# Patient Record
Sex: FEMALE | Race: WHITE | Employment: PART TIME | URBAN - METROPOLITAN AREA
[De-identification: names, ages, dates, MRNs, and addresses within clinical notes are randomized per-mention and may not be internally consistent; named-entity substitution may affect disease eponyms.]

---

## 2018-09-25 ENCOUNTER — APPOINTMENT (OUTPATIENT)
Dept: MRI IMAGING | Facility: CLINIC | Age: 48
End: 2018-09-25
Attending: PHYSICIAN ASSISTANT
Payer: COMMERCIAL

## 2018-09-25 ENCOUNTER — HOSPITAL ENCOUNTER (OUTPATIENT)
Facility: CLINIC | Age: 48
Setting detail: OBSERVATION
Discharge: HOME | End: 2018-09-27
Attending: EMERGENCY MEDICINE | Admitting: HOSPITALIST
Payer: COMMERCIAL

## 2018-09-25 DIAGNOSIS — R42 VERTIGO: ICD-10-CM

## 2018-09-25 DIAGNOSIS — H81.20 VESTIBULAR NEURONITIS, UNSPECIFIED LATERALITY: Primary | ICD-10-CM

## 2018-09-25 LAB
ANION GAP SERPL CALCULATED.3IONS-SCNC: 9 MMOL/L (ref 3–14)
B-HCG FREE SERPL-ACNC: 6 IU/L
B-HCG SERPL-ACNC: <1 IU/L (ref 0–5)
BASOPHILS # BLD AUTO: 0.1 10E9/L (ref 0–0.2)
BASOPHILS NFR BLD AUTO: 0.6 %
BUN SERPL-MCNC: 13 MG/DL (ref 7–30)
CALCIUM SERPL-MCNC: 8.6 MG/DL (ref 8.5–10.1)
CHLORIDE SERPL-SCNC: 107 MMOL/L (ref 94–109)
CO2 SERPL-SCNC: 23 MMOL/L (ref 20–32)
CREAT SERPL-MCNC: 0.72 MG/DL (ref 0.52–1.04)
DIFFERENTIAL METHOD BLD: NORMAL
EOSINOPHIL # BLD AUTO: 0.1 10E9/L (ref 0–0.7)
EOSINOPHIL NFR BLD AUTO: 1.1 %
ERYTHROCYTE [DISTWIDTH] IN BLOOD BY AUTOMATED COUNT: 13.3 % (ref 10–15)
GFR SERPL CREATININE-BSD FRML MDRD: 86 ML/MIN/1.7M2
GLUCOSE SERPL-MCNC: 104 MG/DL (ref 70–99)
HCT VFR BLD AUTO: 38.9 % (ref 35–47)
HGB BLD-MCNC: 12.9 G/DL (ref 11.7–15.7)
IMM GRANULOCYTES # BLD: 0 10E9/L (ref 0–0.4)
IMM GRANULOCYTES NFR BLD: 0.3 %
LYMPHOCYTES # BLD AUTO: 1.2 10E9/L (ref 0.8–5.3)
LYMPHOCYTES NFR BLD AUTO: 13 %
MCH RBC QN AUTO: 31.9 PG (ref 26.5–33)
MCHC RBC AUTO-ENTMCNC: 33.2 G/DL (ref 31.5–36.5)
MCV RBC AUTO: 96 FL (ref 78–100)
MONOCYTES # BLD AUTO: 0.4 10E9/L (ref 0–1.3)
MONOCYTES NFR BLD AUTO: 4.6 %
NEUTROPHILS # BLD AUTO: 7.2 10E9/L (ref 1.6–8.3)
NEUTROPHILS NFR BLD AUTO: 80.4 %
NRBC # BLD AUTO: 0 10*3/UL
NRBC BLD AUTO-RTO: 0 /100
PLATELET # BLD AUTO: 325 10E9/L (ref 150–450)
POTASSIUM SERPL-SCNC: 3.8 MMOL/L (ref 3.4–5.3)
RBC # BLD AUTO: 4.05 10E12/L (ref 3.8–5.2)
SODIUM SERPL-SCNC: 139 MMOL/L (ref 133–144)
WBC # BLD AUTO: 8.9 10E9/L (ref 4–11)

## 2018-09-25 PROCEDURE — 70544 MR ANGIOGRAPHY HEAD W/O DYE: CPT

## 2018-09-25 PROCEDURE — 25000128 H RX IP 250 OP 636: Performed by: PHYSICIAN ASSISTANT

## 2018-09-25 PROCEDURE — 84702 CHORIONIC GONADOTROPIN TEST: CPT

## 2018-09-25 PROCEDURE — 25000128 H RX IP 250 OP 636: Performed by: EMERGENCY MEDICINE

## 2018-09-25 PROCEDURE — 80048 BASIC METABOLIC PNL TOTAL CA: CPT | Performed by: PHYSICIAN ASSISTANT

## 2018-09-25 PROCEDURE — 96375 TX/PRO/DX INJ NEW DRUG ADDON: CPT

## 2018-09-25 PROCEDURE — 25000131 ZZH RX MED GY IP 250 OP 636 PS 637: Performed by: PHYSICIAN ASSISTANT

## 2018-09-25 PROCEDURE — 99219 ZZC INITIAL OBSERVATION CARE,LEVL II: CPT | Performed by: PHYSICIAN ASSISTANT

## 2018-09-25 PROCEDURE — 25000132 ZZH RX MED GY IP 250 OP 250 PS 637: Performed by: PHYSICIAN ASSISTANT

## 2018-09-25 PROCEDURE — G0378 HOSPITAL OBSERVATION PER HR: HCPCS

## 2018-09-25 PROCEDURE — 84702 CHORIONIC GONADOTROPIN TEST: CPT | Performed by: PHYSICIAN ASSISTANT

## 2018-09-25 PROCEDURE — 25000125 ZZHC RX 250: Performed by: PHYSICIAN ASSISTANT

## 2018-09-25 PROCEDURE — 25500064 ZZH RX 255 OP 636: Performed by: EMERGENCY MEDICINE

## 2018-09-25 PROCEDURE — A9585 GADOBUTROL INJECTION: HCPCS | Performed by: EMERGENCY MEDICINE

## 2018-09-25 PROCEDURE — 96374 THER/PROPH/DIAG INJ IV PUSH: CPT

## 2018-09-25 PROCEDURE — 85025 COMPLETE CBC W/AUTO DIFF WBC: CPT | Performed by: PHYSICIAN ASSISTANT

## 2018-09-25 PROCEDURE — 99207 ZZC CDG-MDM COMPONENT: MEETS LOW - DOWN CODED: CPT | Performed by: PHYSICIAN ASSISTANT

## 2018-09-25 PROCEDURE — 96361 HYDRATE IV INFUSION ADD-ON: CPT

## 2018-09-25 PROCEDURE — 70549 MR ANGIOGRAPH NECK W/O&W/DYE: CPT

## 2018-09-25 PROCEDURE — 70553 MRI BRAIN STEM W/O & W/DYE: CPT

## 2018-09-25 PROCEDURE — 99285 EMERGENCY DEPT VISIT HI MDM: CPT | Mod: 25

## 2018-09-25 RX ORDER — MECLIZINE HCL 12.5 MG 12.5 MG/1
25 TABLET ORAL 4 TIMES DAILY PRN
Qty: 30 TABLET | Refills: 0 | Status: SHIPPED | OUTPATIENT
Start: 2018-09-25 | End: 2018-09-27

## 2018-09-25 RX ORDER — DIPHENHYDRAMINE HYDROCHLORIDE 50 MG/ML
25 INJECTION INTRAMUSCULAR; INTRAVENOUS ONCE
Status: COMPLETED | OUTPATIENT
Start: 2018-09-25 | End: 2018-09-25

## 2018-09-25 RX ORDER — ONDANSETRON 2 MG/ML
4 INJECTION INTRAMUSCULAR; INTRAVENOUS EVERY 6 HOURS PRN
Status: DISCONTINUED | OUTPATIENT
Start: 2018-09-25 | End: 2018-09-27 | Stop reason: HOSPADM

## 2018-09-25 RX ORDER — METOCLOPRAMIDE HYDROCHLORIDE 5 MG/ML
10 INJECTION INTRAMUSCULAR; INTRAVENOUS ONCE
Status: COMPLETED | OUTPATIENT
Start: 2018-09-25 | End: 2018-09-25

## 2018-09-25 RX ORDER — NALOXONE HYDROCHLORIDE 0.4 MG/ML
.1-.4 INJECTION, SOLUTION INTRAMUSCULAR; INTRAVENOUS; SUBCUTANEOUS
Status: DISCONTINUED | OUTPATIENT
Start: 2018-09-25 | End: 2018-09-27 | Stop reason: HOSPADM

## 2018-09-25 RX ORDER — AMOXICILLIN 250 MG
2 CAPSULE ORAL 2 TIMES DAILY PRN
Status: DISCONTINUED | OUTPATIENT
Start: 2018-09-25 | End: 2018-09-27 | Stop reason: HOSPADM

## 2018-09-25 RX ORDER — ONDANSETRON 2 MG/ML
4 INJECTION INTRAMUSCULAR; INTRAVENOUS EVERY 30 MIN PRN
Status: DISCONTINUED | OUTPATIENT
Start: 2018-09-25 | End: 2018-09-25

## 2018-09-25 RX ORDER — LORAZEPAM 2 MG/ML
1 INJECTION INTRAMUSCULAR ONCE
Status: COMPLETED | OUTPATIENT
Start: 2018-09-25 | End: 2018-09-25

## 2018-09-25 RX ORDER — IBUPROFEN 600 MG/1
600 TABLET, FILM COATED ORAL EVERY 6 HOURS PRN
Status: DISCONTINUED | OUTPATIENT
Start: 2018-09-25 | End: 2018-09-27 | Stop reason: HOSPADM

## 2018-09-25 RX ORDER — GADOBUTROL 604.72 MG/ML
10 INJECTION INTRAVENOUS ONCE
Status: COMPLETED | OUTPATIENT
Start: 2018-09-25 | End: 2018-09-25

## 2018-09-25 RX ORDER — PROCHLORPERAZINE MALEATE 5 MG
10 TABLET ORAL EVERY 6 HOURS PRN
Status: DISCONTINUED | OUTPATIENT
Start: 2018-09-25 | End: 2018-09-27 | Stop reason: HOSPADM

## 2018-09-25 RX ORDER — DIAZEPAM 10 MG/2ML
5 INJECTION, SOLUTION INTRAMUSCULAR; INTRAVENOUS ONCE
Status: COMPLETED | OUTPATIENT
Start: 2018-09-25 | End: 2018-09-25

## 2018-09-25 RX ORDER — SODIUM CHLORIDE 9 MG/ML
1000 INJECTION, SOLUTION INTRAVENOUS CONTINUOUS
Status: DISCONTINUED | OUTPATIENT
Start: 2018-09-25 | End: 2018-09-25

## 2018-09-25 RX ORDER — LORATADINE 10 MG/1
10 TABLET ORAL DAILY
Status: DISCONTINUED | OUTPATIENT
Start: 2018-09-25 | End: 2018-09-27 | Stop reason: HOSPADM

## 2018-09-25 RX ORDER — PROCHLORPERAZINE 25 MG
25 SUPPOSITORY, RECTAL RECTAL EVERY 12 HOURS PRN
Status: DISCONTINUED | OUTPATIENT
Start: 2018-09-25 | End: 2018-09-27 | Stop reason: HOSPADM

## 2018-09-25 RX ORDER — MECLIZINE HYDROCHLORIDE 25 MG/1
25 TABLET ORAL EVERY 6 HOURS SCHEDULED
Status: DISCONTINUED | OUTPATIENT
Start: 2018-09-25 | End: 2018-09-27 | Stop reason: HOSPADM

## 2018-09-25 RX ORDER — TRIAMCINOLONE ACETONIDE 55 UG/1
1-2 SPRAY, METERED NASAL DAILY PRN
COMMUNITY

## 2018-09-25 RX ORDER — ACETAMINOPHEN 650 MG/1
650 SUPPOSITORY RECTAL EVERY 4 HOURS PRN
Status: DISCONTINUED | OUTPATIENT
Start: 2018-09-25 | End: 2018-09-27 | Stop reason: HOSPADM

## 2018-09-25 RX ORDER — ACETAMINOPHEN 325 MG/1
650 TABLET ORAL EVERY 4 HOURS PRN
Status: DISCONTINUED | OUTPATIENT
Start: 2018-09-25 | End: 2018-09-27 | Stop reason: HOSPADM

## 2018-09-25 RX ORDER — DIAZEPAM 5 MG
5 TABLET ORAL EVERY 6 HOURS PRN
Status: DISCONTINUED | OUTPATIENT
Start: 2018-09-25 | End: 2018-09-26

## 2018-09-25 RX ORDER — MECLIZINE HYDROCHLORIDE 25 MG/1
25 TABLET ORAL ONCE
Status: COMPLETED | OUTPATIENT
Start: 2018-09-25 | End: 2018-09-25

## 2018-09-25 RX ORDER — AMOXICILLIN 250 MG
1 CAPSULE ORAL 2 TIMES DAILY PRN
Status: DISCONTINUED | OUTPATIENT
Start: 2018-09-25 | End: 2018-09-27 | Stop reason: HOSPADM

## 2018-09-25 RX ORDER — POLYETHYLENE GLYCOL 3350 17 G/17G
17 POWDER, FOR SOLUTION ORAL DAILY PRN
Status: DISCONTINUED | OUTPATIENT
Start: 2018-09-25 | End: 2018-09-27 | Stop reason: HOSPADM

## 2018-09-25 RX ORDER — LIDOCAINE 40 MG/G
CREAM TOPICAL
Status: DISCONTINUED | OUTPATIENT
Start: 2018-09-25 | End: 2018-09-27 | Stop reason: HOSPADM

## 2018-09-25 RX ORDER — ONDANSETRON 4 MG/1
4 TABLET, FILM COATED ORAL EVERY 8 HOURS PRN
Qty: 6 TABLET | Refills: 0 | Status: SHIPPED | OUTPATIENT
Start: 2018-09-25 | End: 2018-09-27

## 2018-09-25 RX ORDER — ONDANSETRON 4 MG/1
4 TABLET, ORALLY DISINTEGRATING ORAL EVERY 6 HOURS PRN
Status: DISCONTINUED | OUTPATIENT
Start: 2018-09-25 | End: 2018-09-27 | Stop reason: HOSPADM

## 2018-09-25 RX ORDER — LORATADINE 10 MG/1
10 TABLET ORAL DAILY
COMMUNITY
End: 2020-09-01

## 2018-09-25 RX ADMIN — LORAZEPAM 1 MG: 2 INJECTION INTRAMUSCULAR; INTRAVENOUS at 15:59

## 2018-09-25 RX ADMIN — LORATADINE 10 MG: 10 TABLET ORAL at 20:30

## 2018-09-25 RX ADMIN — ONDANSETRON 4 MG: 2 INJECTION INTRAMUSCULAR; INTRAVENOUS at 12:11

## 2018-09-25 RX ADMIN — MECLIZINE HYDROCHLORIDE 25 MG: 25 TABLET ORAL at 12:10

## 2018-09-25 RX ADMIN — DIAZEPAM 5 MG: 5 INJECTION, SOLUTION INTRAMUSCULAR; INTRAVENOUS at 16:55

## 2018-09-25 RX ADMIN — MECLIZINE HYDROCHLORIDE 25 MG: 25 TABLET ORAL at 20:30

## 2018-09-25 RX ADMIN — PREDNISONE 60 MG: 10 TABLET ORAL at 20:30

## 2018-09-25 RX ADMIN — DIPHENHYDRAMINE HYDROCHLORIDE 25 MG: 50 INJECTION, SOLUTION INTRAMUSCULAR; INTRAVENOUS at 13:58

## 2018-09-25 RX ADMIN — SODIUM CHLORIDE 1000 ML: 9 INJECTION, SOLUTION INTRAVENOUS at 13:34

## 2018-09-25 RX ADMIN — GADOBUTROL 10 ML: 604.72 INJECTION INTRAVENOUS at 15:32

## 2018-09-25 RX ADMIN — SODIUM CHLORIDE 1000 ML: 9 INJECTION, SOLUTION INTRAVENOUS at 12:11

## 2018-09-25 RX ADMIN — MECLIZINE HYDROCHLORIDE 25 MG: 25 TABLET ORAL at 23:34

## 2018-09-25 RX ADMIN — METOCLOPRAMIDE 10 MG: 5 INJECTION, SOLUTION INTRAMUSCULAR; INTRAVENOUS at 14:00

## 2018-09-25 ASSESSMENT — ENCOUNTER SYMPTOMS
HEADACHES: 1
CHEST TIGHTNESS: 1
NECK PAIN: 0
FEVER: 0
VOMITING: 1
DIZZINESS: 1
NAUSEA: 1

## 2018-09-25 NOTE — ED NOTES
Bed: ED32  Expected date: 9/25/18  Expected time: 11:03 AM  Means of arrival: Ambulance  Comments:  [id number]  49yo vertigo

## 2018-09-25 NOTE — ED AVS SNAPSHOT
[facility] Emergency Department    [address]    [facility] [address]    Phone:  [phone]    Fax:  [phone]                                       Lorraine Sepulveda   MRN: [id number]    Department:  [facility] Emergency Department   Date of Visit:  9/25/2018           Patient Information     Date Of Birth          1970        Your diagnoses for this visit were:     Benign paroxysmal positional vertigo, bilateral        You were seen by Juliocesar Anderson MD.      Follow-up Information     Schedule an appointment as soon as possible for a visit with [facility].    Contact information:    [address]  [address]  [place] [address] [phone]        Follow up with Clinic, [facility].    Why:  As needed    Contact information:    [address] [place] [id number] [phone]          Follow up with [facility] Emergency Department.    Specialty:  EMERGENCY MEDICINE    Why:  If symptoms worsen    Contact information:    [address] [facility] [address] [phone]        Discharge Instructions         Benign Paroxysmal Positional Vertigo     Your health care provider may move your head in certain ways to treat your BPPV.     Benign paroxysmal positional vertigo (BPPV) is a problem with the inner ear. The inner ear contains the vestibular system. This system is what helps you keep your balance. BPPV causes a feeling of spinning. It is a common problem of the vestibular system.  Understanding the vestibular system  The vestibular system of the ear is made up of very tiny parts. They include the utricle, saccule, and semicircular canals. The utricle is a tiny organ that contains calcium crystals. In some people, the crystals can move into the semicircular canals. When this happens, the system no longer works as it should. This causes BPPV. Benign means it is not life threatening. Paroxysmal  means it happens suddenly. Positional means that it happens when you move your head. Vertigo is a feeling of spinning.  What causes BPPV?  Causes include injury to your head or neck. Other problems with the vestibular system may cause BPPV. In many people, the cause of BPPV is not known.  Symptoms of BPPV  You many have repeated feelings of spinning (vertigo). The vertigo usually lasts less than 1 minute. Some movements, such as rolling over in bed, can bring on vertigo.  Diagnosing BPPV  Your primary healthcare provider may diagnose and treat your BPPV. Or you may see an ear, nose, and throat doctor (otolaryngologist). In some cases, you may see a nervous system doctor (neurologist).  The healthcare provider will ask about your symptoms and your medical history. He or she will examine you. You may have hearing and balance tests. As part of the exam, your healthcare provider may have you move your head and body in certain ways. If you have BPPV, the movements can bring on vertigo. Your provider will also look for abnormal movements of your eyes. You may have other tests to check your vestibular or nervous systems.  Treatment for BPPV  Your healthcare provider may try to move the calcium crystals. This is done by having you move your head and neck in certain ways. This treatment is safe and often works well. You may also be told to do these movements at home. You may still have vertigo for a few weeks. Your healthcare provider will recheck your symptoms, usually in about a month. Special physical therapy may also be part of treatment. In rare cases, surgery may be needed for BPPV that does not go away.     When to call the healthcare provider  Call your healthcare provider right away if you have any of these:    Symptoms that do not go away with treatment    Symptoms that get worse    New symptoms   Date Last Reviewed: 5/1/2017 2000-2017 [employer]. [address] [place] [address], [place] [address]. All  rights reserved. This information is not intended as a substitute for professional medical care. Always follow your healthcare professional's instructions.          24 Hour Appointment Hotline       To make an appointment at any [facility], call [phone] ([phone]). If you don't have a family doctor or clinic, we will help you find one. [facility] clinics are conveniently located to serve the needs of you and your family.             Review of your medicines      START taking        Dose / Directions Last dose taken    meclizine 12.5 MG tablet   Commonly known as:  ANTIVERT   Dose:  25 mg   Quantity:  30 tablet        Take 2 tablets (25 mg) by mouth 4 times daily as needed for dizziness   Refills:  0        ondansetron 4 MG tablet   Commonly known as:  ZOFRAN   Dose:  4 mg   Quantity:  6 tablet        Take 1 tablet (4 mg) by mouth every 8 hours as needed for nausea   Refills:  0                Prescriptions were sent or printed at these locations (2 Prescriptions)                   Other Prescriptions                Printed at Department/Unit printer (2 of 2)         meclizine (ANTIVERT) 12.5 MG tablet               ondansetron (ZOFRAN) 4 MG tablet                Procedures and tests performed during your visit     Basic metabolic panel    CBC with platelets differential    HCG quantitative pregnancy    ISTAT HCG Quantitative Pregnancy Nursing POCT    ISTAT HCG Quantitative Pregnancy POCT    MR Brain w/o & w Contrast    MR Head w/o Contrast Angiogram    MR Neck w/o & w Contrast Angiogram      Orders Needing Specimen Collection     None      Pending Results     No orders found from 9/23/2018 to 9/26/2018.            Pending Culture Results     No orders found from 9/23/2018 to 9/26/2018.            Pending Results Instructions     If you had any lab results that were not finalized at the time of your Discharge, you can call the ED Lab Result RN at [phone]. You will be contacted by this team for  any positive Lab results or changes in treatment. The nurses are available 7 days a week from 10A to 6:30P.  You can leave a message 24 hours per day and they will return your call.        Test Results From Your Hospital Stay        9/25/2018 12:52 PM      Component Results     Component Value Ref Range & Units Status    WBC 8.9 4.0 - 11.0 10e9/L Final    RBC Count 4.05 3.8 - 5.2 10e12/L Final    Hemoglobin 12.9 11.7 - 15.7 g/dL Final    Hematocrit 38.9 35.0 - 47.0 % Final    MCV 96 78 - 100 fl Final    MCH 31.9 26.5 - 33.0 pg Final    MCHC 33.2 31.5 - 36.5 g/dL Final    RDW 13.3 10.0 - 15.0 % Final    Platelet Count 325 150 - 450 10e9/L Final    Diff Method Automated Method  Final    % Neutrophils 80.4 % Final    % Lymphocytes 13.0 % Final    % Monocytes 4.6 % Final    % Eosinophils 1.1 % Final    % Basophils 0.6 % Final    % Immature Granulocytes 0.3 % Final    Nucleated RBCs 0 0 /100 Final    Absolute Neutrophil 7.2 1.6 - 8.3 10e9/L Final    Absolute Lymphocytes 1.2 0.8 - 5.3 10e9/L Final    Absolute Monocytes 0.4 0.0 - 1.3 10e9/L Final    Absolute Eosinophils 0.1 0.0 - 0.7 10e9/L Final    Absolute Basophils 0.1 0.0 - 0.2 10e9/L Final    Abs Immature Granulocytes 0.0 0 - 0.4 10e9/L Final    Absolute Nucleated RBC 0.0  Final         9/25/2018  1:24 PM      Component Results     Component Value Ref Range & Units Status    Sodium 139 133 - 144 mmol/L Final    Potassium 3.8 3.4 - 5.3 mmol/L Final    Chloride 107 94 - 109 mmol/L Final    Carbon Dioxide 23 20 - 32 mmol/L Final    Anion Gap 9 3 - 14 mmol/L Final    Glucose 104 (H) 70 - 99 mg/dL Final    Urea Nitrogen 13 7 - 30 mg/dL Final    Creatinine 0.72 0.52 - 1.04 mg/dL Final    GFR Estimate 86 >60 mL/min/1.7m2 Final    Non  GFR Calc    GFR Estimate If Black >90 >60 mL/min/1.7m2 Final    African American GFR Calc    Calcium 8.6 8.5 - 10.1 mg/dL Final         9/25/2018  4:11 PM      Narrative     MRI BRAIN WITHOUT AND WITH CONTRAST  9/25/2018 3:42  PM    HISTORY: Chronic persistent vertigo.      TECHNIQUE: Multiplanar, multisequence MRI of the brain without and  with 10 mL Gadavist.    COMPARISON: None    FINDINGS: Mild volume loss is present commensurate with age. Few  frontoparietal predominant T2 FLAIR hyperintensities likely represent  chronic small vessel ischemic change commensurate with age. The  cerebral hemispheres, brainstem, and cerebellum otherwise demonstrate  normal morphology and attenuation. Subcentimeter left perivascular  space noted within the basilar ganglia. No evidence of acute ischemia,  hemorrhage, mass, mass effect, or hydrocephalus. No abnormal  enhancement or diffusion restriction is identified. Visualized cranial  nerves and turbinate bones are unremarkable. The visualized calvarium,  skull base, paranasal sinuses, and extracranial soft tissues are  unremarkable.        Impression     IMPRESSION: Unremarkable MRI of the head with and without contrast.    KENROY MYLES MD         9/25/2018  4:02 PM      Narrative     MR ANGIOGRAM OF THE HEAD WITHOUT CONTRAST   9/25/2018 3:42 PM     HISTORY: Headache     TECHNIQUE: 3D time-of-flight MR angiogram of the head without  contrast.    COMPARISON: None.    FINDINGS: The bilateral intracranial vertebral arteries, basilar  artery, and posterior cerebral arteries are patent. The bilateral  intracranial internal carotid arteries, anterior cerebral arteries,  and middle cerebral arteries are patent. No aneurysms or vascular  reformations are identified. Patent bilateral posterior communicating  arteries are identified. Possible tiny patent anterior communicating  artery.        Impression     IMPRESSION: Normal MR angiogram of the head.        KENROY MYLES MD         9/25/2018  4:02 PM      Narrative     MRA NECK WITHOUT AND WITH CONTRAST  9/25/2018 3:42 PM     HISTORY: Headache.     TECHNIQUE: 2D time-of-flight MR angiogram of the neck without contrast  and 3D MR angiogram of the neck with  10 mL Gadavist. Estimates of  carotid stenoses are made relative to the distal internal carotid  artery diameters except as noted.    COMPARISON: None.    FINDINGS:    Normal origin of the great vessels from the aortic arch.    Right carotid artery: The right common and internal carotid arteries  are patent. No significant stenosis.      Left carotid artery: The left common and internal carotid arteries are  patent. No significant stenosis.      Vertebral arteries: Vertebral arteries appear patent without evidence  of dissection. No significant stenosis.          Impression     IMPRESSION:  Normal MR angiogram of the neck.        KENROY MYLES MD         9/25/2018  1:26 PM      Component Results     Component Value Ref Range & Units Status    HCG Quantitative Serum <1 0 - 5 IU/L Final         9/25/2018 12:45 PM      Component Results     Component Value Ref Range & Units Status    HCG Quantitative Serum 6.0 (H) <5.0 IU/L Final                Clinical Quality Measure: Blood Pressure Screening     Your blood pressure was checked while you were in the emergency department today. The last reading we obtained was  BP: 136/82 . Please read the guidelines below about what these numbers mean and what you should do about them.  If your systolic blood pressure (the top number) is less than 120 and your diastolic blood pressure (the bottom number) is less than 80, then your blood pressure is normal. There is nothing more that you need to do about it.  If your systolic blood pressure (the top number) is 120-139 or your diastolic blood pressure (the bottom number) is 80-89, your blood pressure may be higher than it should be. You should have your blood pressure rechecked within a year by a primary care provider.  If your systolic blood pressure (the top number) is 140 or greater or your diastolic blood pressure (the bottom number) is 90 or greater, you may have high blood pressure. High blood pressure is treatable, but if left  "untreated over time it can put you at risk for heart attack, stroke, or kidney failure. You should have your blood pressure rechecked by a primary care provider within the next 4 weeks.  If your provider in the emergency department today gave you specific instructions to follow-up with your doctor or provider even sooner than that, you should follow that instruction and not wait for up to 4 weeks for your follow-up visit.        Thank you for choosing [facility]       Thank you for choosing [facility] for your care. Our goal is always to provide you with excellent care. Hearing back from our patients is one way we can continue to improve our services. Please take a few minutes to complete the written survey that you may receive in the mail after you visit with us. Thank you!        [employer] Information     [employer] lets you send messages to your doctor, view your test results, renew your prescriptions, schedule appointments and more. To sign up, go to [email or web address] . Click on \"Log in\" on the left side of the screen, which will take you to the Welcome page. Then click on \"Sign up Now\" on the right side of the page.     You will be asked to enter the access code listed below, as well as some personal information. Please follow the directions to create your username and password.     Your access code is: [email or web address]  Expires: 2018  4:28 PM     Your access code will  in 90 days. If you need help or a new code, please call your [facility] clinic or [phone].        Care EveryWhere ID     This is your Care EveryWhere ID. This could be used by other organizations to access your [facility] medical records  [id number]        Equal Access to Services     [facility] : Hadii dimitrios Antonio, waaxda luqadaha, qaybta kaalmajolene lara. So [facility] [phone].    ATENCIÓN: Si habla español, tiene a howe disposición servicios gratuitos de asistencia lingüística. " Gian alan [phone].    We comply with applicable federal civil rights laws and [place] laws. We do not discriminate on the basis of race, color, national origin, age, disability, sex, sexual orientation, or gender identity.            After Visit Summary       This is your record. Keep this with you and show to your community pharmacist(s) and doctor(s) at your next visit.

## 2018-09-25 NOTE — PHARMACY-ADMISSION MEDICATION HISTORY
Admission medication history interview status for this patient is complete. See [facility] admission navigator for allergy information, prior to admission medications and immunization status.     Medication history interview source(s):Patient and Family  Medication history resources (including written lists, pill bottles, clinic record):None  Primary pharmacy:-    Changes made to PTA medication list:  Added: claritin, nasacort  Deleted: -  Changed: -    Actions taken by pharmacist (provider contacted, etc):None     Additional medication history information:None    Medication reconciliation/reorder completed by provider prior to medication history? No    Do you take OTC medications (eg tylenol, ibuprofen, fish oil, eye/ear drops, etc)? yes(Y/N)    For patients on insulin therapy: no (Y/N)  Lantus/levemir/NPH/Mix 70/30 dose:   (Y/N) (see Med list for doses)   Sliding scale Novolog Y/N  If Yes, do you have a baseline novolog pre-meal dose:  units with meals  Patients eat three meals a day:   Y/N    How many episodes of hypoglycemia do you have per week: _______  How many missed doses do you have per week: ______  How many times do you check your blood glucose per day: _______   Any Barriers to therapy - Be specific :  cost of medications, comfortable with giving injections (if applicable), comfortable and confident with current diabetes regimen: Y/N ______________      Prior to Admission medications    Medication Sig Last Dose Taking? Auth Provider   loratadine (CLARITIN) 10 MG tablet Take 10 mg by mouth daily 9/24/2018 at Unknown time Yes Unknown, Entered By History   meclizine (ANTIVERT) 12.5 MG tablet Take 2 tablets (25 mg) by mouth 4 times daily as needed for dizziness  Yes Graham Hendrickson PA-C   ondansetron (ZOFRAN) 4 MG tablet Take 1 tablet (4 mg) by mouth every 8 hours as needed for nausea  Yes Graham Hendrickson PA-C   triamcinolone (NASACORT) 55 MCG/ACT inhaler Spray 1-2 sprays into both nostrils daily as  needed  Yes Unknown, Entered By History

## 2018-09-25 NOTE — ED NOTES
"[facility]  ED Nurse Handoff Report    Lorraine Sepulveda is a 48 year old female   ED Chief complaint: Dizziness  . ED Diagnosis:   Final diagnoses:   Vertigo     Allergies: No Known Allergies    Code Status: Prior  Activity level - Baseline/Home:  Independent. Activity Level - Current:   Stand with Assist. Lift room needed: No. Bariatric: No   Needed: No   Isolation: No. Infection: Not Applicable.     Vital Signs:   Vitals:    09/25/18 1615 09/25/18 1630 09/25/18 1700 09/25/18 1715   BP: 122/89 132/75 128/89 131/81   Pulse:       Resp:       Temp:       SpO2: 99% 97% 91% 93%   Weight:       Height:           Cardiac Rhythm:  ,      Pain level: 0-10 Pain Scale: 5  Patient confused: No. Patient Falls Risk: Yes.   Elimination Status: Unable to void   Patient Report - Initial Complaint: Pt with history of pressure behind right eye and dizzy spells. Pt is in the process of workup for symptoms. \" I just finished a course of Augmentin because she thought it might be related to my sinus.\" Pt felt a little dizzy when I got up at 0300 to le the dog out. When I got up a 0900 the dizziness was really bad.\" Pt reports increase in dizziness and room spinning with any movement of head. Pt reports head pressure and pressure behind right eye has improved. Nausea improved with zofran from EMS. . Focused Assessment: Cognitive - Level Of Consciousness: alert Orientation: oriented x 4 Follows Commands: yes Speech: clear Mood/Behavior: calm; cooperative  Daniela Coma Scale - Best Eye Response: 4-->(E4) spontaneous Best Motor Response: 6-->(M6) obeys commands Best Verbal Response: 5-->(V5) oriented Daniela Coma Scale Score: 15  Motor Strength - Left Upper: 5 - active movement against gravity and full resistance Right Upper: 5 - active movement against gravity and full resistance Left Lower: 5 - active movement against gravity and full resistance Right Lower: 5 - active movement against gravity and full resistance "   Tests Performed: Labs, MRI. Abnormal Results:   Labs Ordered and Resulted from Time of ED Arrival Up to the Time of Departure from the ED   BASIC METABOLIC PANEL - Abnormal; Notable for the following:        Result Value    Glucose 104 (*)     All other components within normal limits   ISTAT HCG QUANTITATIVE PREGNANCY POCT - Abnormal; Notable for the following:     HCG Quantitative Serum 6.0 (*)     All other components within normal limits   CBC WITH PLATELETS DIFFERENTIAL   HCG QUANTITATIVE PREGNANCY   ISTAT HCG QUANTITATIVE PREGNANCY NURSING POCT     MR Brain w/o & w Contrast   Final Result   IMPRESSION: Unremarkable MRI of the head with and without contrast.      KENROY MYLES MD      MR Neck w/o & w Contrast Angiogram   Final Result   IMPRESSION:  Normal MR angiogram of the neck.           KENROY MYLES MD      MR Head w/o Contrast Angiogram   Final Result   IMPRESSION: Normal MR angiogram of the head.           KENROY MYLES MD      .   Treatments provided: Fluids, Benadryl, Reglan, Antivert, Zofran, Ativan  Family Comments: NA  OBS brochure/video discussed/provided to patient:  Yes  ED Medications:   Medications   0.9% sodium chloride BOLUS (0 mLs Intravenous Stopped 9/25/18 1310)     Followed by   sodium chloride 0.9% infusion (0 mLs Intravenous Stopped 9/25/18 1628)   ondansetron (ZOFRAN) injection 4 mg (4 mg Intravenous Given 9/25/18 1211)   meclizine (ANTIVERT) tablet 25 mg (25 mg Oral Given 9/25/18 1210)   diphenhydrAMINE (BENADRYL) injection 25 mg (25 mg Intravenous Given 9/25/18 1358)   metoclopramide (REGLAN) injection 10 mg (10 mg Intravenous Given 9/25/18 1400)   gadobutrol (GADAVIST) injection 10 mL (10 mLs Intravenous Given 9/25/18 1532)   sodium chloride (PF) 0.9% PF flush 60 mL (60 mLs Intravenous Given 9/25/18 1532)   LORazepam (ATIVAN) injection 1 mg (1 mg Intravenous Given 9/25/18 1559)   diazepam (VALIUM) injection 5 mg (5 mg Intravenous Given 9/25/18 1655)     Drips infusing:   No  For the majority of the shift, the patient's behavior Green. Interventions performed were NA.     Severe Sepsis OR Septic Shock Diagnosis Present: No      ED Nurse Name/Phone Number: Sierra Gandara,   5:28 PM    RECEIVING UNIT ED HANDOFF REVIEW    Above ED Nurse Handoff Report was reviewed: Yes  Reviewed by: Shanel Soto on September 25, 2018 at 5:46 PM

## 2018-09-25 NOTE — ED NOTES
"Pt resting with lights dimmed. States pressure to head and behind eye has lessened from an 8 to a 6. Room spinning has lesssened   \"But still feels fluttery and foggy and I can't move my head at all. \"   "

## 2018-09-25 NOTE — ED PROVIDER NOTES
History     Chief Complaint:  Dizziness    HPI   Lorraine Sepulveda is a 48 year old female who presents to the emergency department today with dizziness. The patient reports that she had influenza in February which was associated with vertigo. She was put through physical treatment and the vertigo seemed to resolve. She had vertigo again in August and had an associated sinus infection. She was treated with antibiotics for the sinus infection.     This morning she woke up at 0900 and started throwing up because she was so dizzy, the dizziness is much worse than previous episodes. She has had multiple vomiting episodes and was unable to get into the car this morning due to vomiting. The room is spinning even when laying down and associated headache and pressure, and congestion. She rates her pain as 5/10. She reports chest tightness, but denies chest pain. She took Dramamine which did not resolve the problem. She denies neck pain, fever.     Allergies:  No Known Drug Allergies     Medications:    The patient is not currently taking any prescribed medications.    Past Medical History:    Asthma    Past Surgical History:    History reviewed. No pertinent past surgical history.    Family History:    History reviewed. No pertinent family history.     Social History:  The patient was accompanied to the ED by .  Smoking Status: Never  Smokeless Tobacco: Never  Alcohol Use: No    Marital Status:       Review of Systems   Constitutional: Negative for fever.   HENT: Positive for congestion.    Respiratory: Positive for chest tightness.    Cardiovascular: Negative for chest pain.   Gastrointestinal: Positive for nausea and vomiting.   Musculoskeletal: Negative for neck pain.   Neurological: Positive for dizziness and headaches.   All other systems reviewed and are negative.    Physical Exam     Patient Vitals for the past 24 hrs:   BP Temp Pulse Resp SpO2 Height Weight   09/25/18 1630 132/75 - - - 97 % - -  "  09/25/18 1615 122/89 - - - 99 % - -   09/25/18 1430 136/82 - - - 95 % - -   09/25/18 1415 129/77 - - - 90 % - -   09/25/18 1400 132/87 - - - 98 % - -   09/25/18 1345 131/79 - - - 93 % - -   09/25/18 1330 131/87 - - - 92 % - -   09/25/18 1300 133/84 - - - 98 % - -   09/25/18 1245 139/84 - - - 95 % - -   09/25/18 1230 135/90 - - - 98 % - -   09/25/18 1215 141/85 - - - - - -   09/25/18 1200 130/82 - - - - - -   09/25/18 1145 - - - - 98 % - -   09/25/18 1134 136/86 98.1  F (36.7  C) 66 18 100 % 1.676 m (5' 6\") 125.2 kg (276 lb)      Physical Exam    Exam:  Constitutional: healthy, alert and no distress  Head: Normocephalic. No masses, lesions, tenderness or abnormalities  Neck: Neck supple. No adenopathy. Thyroid symmetric, normal size,Carotids without bruits.  ENT: ENT exam normal, no neck nodes or sinus tenderness  Cardiovascular: negative, PMI normal. No lifts, heaves, or thrills. RRR. No murmurs, clicks gallops or rub  Respiratory: negative, Percussion normal. Good diaphragmatic excursion. Lungs clear  Gastrointestinal: Abdomen soft, non-tender. BS normal. No masses, organomegaly  : Deferred  Musculoskeletal: extremities normal- no gross deformities noted, gait normal and normal muscle tone  Skin: no suspicious lesions or rashes  Neurologic: Gait normal. Reflexes normal and symmetric. Sensation grossly WNL.  No pronator drift, finger to nose and heel to shin intact without dysmetria, cranial nerves II through XII intact, positive head impulse test bilaterally no nystagmus, negative HINTS exam  Psychiatric: mentation appears normal and affect normal/bright  Hematologic/Lymphatic/Immunologic: Normal cervical lymph nodes    Emergency Department Course   Imaging:  Radiology findings were communicated with the patient who voiced understanding of the findings.  MR Brain  IMPRESSION: Unremarkable MRI of the head with and without contrast.  Report per radiology       MR Neck  IMPRESSION:  Normal MR angiogram of the " neck.    Report per radiology       MR Head  IMPRESSION: Normal MR angiogram of the head.    Report per radiology      Laboratory:  Laboratory findings were communicated with the patient who voiced understanding of the findings.  HCG Qualitative: 6.0(H)  HCG Repeat: <1  CBC: AWNL (WBC 8.9, HGB 12.9, )    BMP: 104(H) Glucose o/w WNL (Creatinine 0.72)       Interventions:  1211: 0.9% NaCl 1L IV Bolus   1210: Antivert 25mg PO   1211: Zofran 4mg IV   1334: 0.9% NaCl 1L IV Bolus   1358: Benadryl 25mg IV   1400: Reglan 10mg IV   1532: Gadavist 10mL IV  1532: NaCl 60mL IV   1559: Ativan 1mg IV     Emergency Department Course:  Nursing notes and vitals reviewed.  1145: I performed an exam of the patient as documented above.   IV was inserted and blood was drawn for laboratory testing, results above.  The patient was sent for a MR Brain, MR Neck, MR Head while in the emergency department, results above.   1618: Findings and plan explained to the Patient. Patient discharged home with instructions regarding supportive care, medications, and reasons to return. The importance of close follow-up was reviewed. The patient was prescribed Antivert and Zofran.    I personally reviewed the laboratory and imaging results with the Patient and answered all related questions prior to admission.   Impression & Plan    Medical Decision Making:    Lorraine Sepulveda is a 48 year old female who presents for evaluation of vertigo. The differential diagnosis of vertigo is broad and includes common etiologies such as menieres disease, labyrinthitis, benign positional vertigo, otitis media, etc.  More serious etiologies considered include central etiologies such as tumor, intracerebral bleed, dissection, ischemic cerebral vascular accident.  Due to her headache, chronicity and severity of her vertigo and feels appropriate to send the patient for MRI/MRA of the brain neck all of which were unremarkable and revealed no central cause of her  vertigo.  The history, physical exam including detailed neurologic exam, and workup in the emergency room suggests a benign cause of vertigo today.  Patient feels improved after interventions noted above.  The patient was offered vestibular and balance rehab, but states that she has been there before in the past and her insurance does not cover it and she is not interested at this time in pursuing vestibular PT again.  She was road tested and and failed due to severe vertigo.  She was given an additional dose of 5 mg of Valium and road tested again and failed a second time and was unable to even get out of her bed.  At that time I did recommend inpatient admission for observation and further medical management of her vertigo.        Diagnosis:    ICD-10-CM    1. Vertigo R42        Disposition:  Admitted to observation    Discharge Medications:  New Prescriptions    MECLIZINE (ANTIVERT) 12.5 MG TABLET    Take 2 tablets (25 mg) by mouth 4 times daily as needed for dizziness    ONDANSETRON (ZOFRAN) 4 MG TABLET    Take 1 tablet (4 mg) by mouth every 8 hours as needed for nausea     Scribe Disclosure:  Lynn CORRIGAN, am serving as a scribe at 11:45 AM on 9/25/2018 to document services personally performed by Juliocesar Anderson, based on my observations and the provider's statements to me.    9/25/2018   [facility] EMERGENCY DEPARTMENT       Graham Hendrickson PA-C  09/25/18 1719      Emergency Department Attending Supervision Note  9/25/2018  11:16 PM      I evaluated this patient in conjunction with RENARD Powell      Briefly, the patient presented by ambulance with her  for evaluation of acute onset of severe vertigo associated with nausea and vomiting this morning.  She does have a history of episodes of vertigo for the past several months, apparently initially started during last winter when she had influenza.  She has been evaluated by vertigo physical therapy clinic who felt that  this was not an inner ear problem and is scheduled to see an ENT in a few weeks.  However today she her episode of vertigo was tremendously more intense with more vomiting, lasting longer than any episode so far.    The patient had no focal neurologic deficits but did have intense vertigo.  It was not episodic her typical for BPPV.  She was too vertiginous and ill for us to feel confident in a HINTS exam.  Therefore MR imaging was obtained, and is fortunately normal.    Despite multiple rounds of medication here in the ER she still too vertiginous to pass an ambulation trial.  I think she would benefit from admission for IV fluids, antiemetics, further treatment.  Discussed in detail with the patient and her  who agree.    Diagnosis  (R42) Vertigo  Intractable vertigo and intractable nausea           Juliocesar Anderson MD  09/25/18 2840

## 2018-09-25 NOTE — IP AVS SNAPSHOT
MRN:[id number]                      After Visit Summary   9/25/2018    Lorraine Sepulveda    MRN: [id number]           Thank you!     Thank you for choosing [facility] for your care. Our goal is always to provide you with excellent care. Hearing back from our patients is one way we can continue to improve our services. Please take a few minutes to complete the written survey that you may receive in the mail after you visit. If you would like to speak to someone directly about your visit please contact Patient Relations at [phone]. Thank you!          Patient Information     Date Of Birth          1970        About your hospital stay     You were admitted on:  September 25, 2018 You last received care in the:  [facility] Observation Department    You were discharged on:  September 27, 2018        Reason for your hospital stay       You were admitted for concerns of dizziness. Your MRI imaging was negative for acute stroke. Your symptoms are due to vestibular neuritis and vertigo. You will need to take prednisone and anti-dizziness medications as needed.                  Who to Call     For medical emergencies, please call 911.  For non-urgent questions about your medical care, please call your primary care provider or clinic, [phone]          Attending Provider     Provider Specialty    Juliocesar Anderson MD Emergency Medicine    GarfieldTerry MD Internal Medicine       Primary Care Provider Office Phone # Fax #    [facility] [phone]      After Care Instructions     Activity       Your activity upon discharge: activity as tolerated. No driving for 2-3 days.            Diet       Follow this diet upon discharge: Orders Placed This Encounter      Regular Diet Adult                  Follow-up Appointments     Follow-up and recommended labs and tests        Follow up with primary care provider, [facility], within 7 days for hospital follow- up.  No follow up labs or test are needed.                  Additional Services     Physical Therapy Referral       If you have not heard from the scheduling office within 2 business days, please call [phone] for all locations, with the exception of [place], please call [phone] and [place], please call [phone].    Please be aware that coverage of these services is subject to the terms and limitations of your health insurance plan.  Call member services at your health plan with any benefit or coverage questions.                   Follow-up Information     Schedule an appointment as soon as possible for a visit with [facility]    Contact information:    [address]  [place] [address] [phone]        Follow up with Clinic, [facility].    Why:  As needed    Contact information:    [address] [place] [address] [phone]          Follow up with [facility] Emergency Department.    Specialty:  EMERGENCY MEDICINE    Why:  If symptoms worsen    Contact information:    [address]  [facility] [address] [phone]                Further instructions from your care team         Benign Paroxysmal Positional Vertigo     Your health care provider may move your head in certain ways to treat your BPPV.     Benign paroxysmal positional vertigo (BPPV) is a problem with the inner ear. The inner ear contains the vestibular system. This system is what helps you keep your balance. BPPV causes a feeling of spinning. It is a common problem of the vestibular system.  Understanding the vestibular system  The vestibular system of the ear is made up of very tiny parts. They include the utricle, saccule, and semicircular canals. The utricle is a tiny organ that contains calcium crystals. In some people, the crystals can move into the semicircular canals. When this happens, the system no  longer works as it should. This causes BPPV. Benign means it is not life threatening. Paroxysmal means it happens suddenly. Positional means that it happens when you move your head. Vertigo is a feeling of spinning.  What causes BPPV?  Causes include injury to your head or neck. Other problems with the vestibular system may cause BPPV. In many people, the cause of BPPV is not known.  Symptoms of BPPV  You many have repeated feelings of spinning (vertigo). The vertigo usually lasts less than 1 minute. Some movements, such as rolling over in bed, can bring on vertigo.  Diagnosing BPPV  Your primary healthcare provider may diagnose and treat your BPPV. Or you may see an ear, nose, and throat doctor (otolaryngologist). In some cases, you may see a nervous system doctor (neurologist).  The healthcare provider will ask about your symptoms and your medical history. He or she will examine you. You may have hearing and balance tests. As part of the exam, your healthcare provider may have you move your head and body in certain ways. If you have BPPV, the movements can bring on vertigo. Your provider will also look for abnormal movements of your eyes. You may have other tests to check your vestibular or nervous systems.  Treatment for BPPV  Your healthcare provider may try to move the calcium crystals. This is done by having you move your head and neck in certain ways. This treatment is safe and often works well. You may also be told to do these movements at home. You may still have vertigo for a few weeks. Your healthcare provider will recheck your symptoms, usually in about a month. Special physical therapy may also be part of treatment. In rare cases, surgery may be needed for BPPV that does not go away.     When to call the healthcare provider  Call your healthcare provider right away if you have any of these:    Symptoms that do not go away with treatment    Symptoms that get worse    New symptoms   Date Last Reviewed:  "2017-2017 [employer]. [address] [place] [address], [place] [address]. All rights reserved. This information is not intended as a substitute for professional medical care. Always follow your healthcare professional's instructions.          Pending Results     No orders found from 2018 to 2018.            Statement of Approval     Ordered          18 0844  I have reviewed and agree with all the recommendations and orders detailed in this document.  EFFECTIVE NOW     Approved and electronically signed by:  Heidi Griffin PA-C             Admission Information     Date & Time Provider Department Dept. Phone    2018 Terry Dubon MD [facility] Observation Department [phone]      Your Vitals Were     Blood Pressure Pulse Temperature Respirations Height Weight    126/75 (BP Location: Right arm) 66 96.1  F (35.6  C) (Oral) 16 1.676 m (5' 6\") 125.2 kg (276 lb)    Pulse Oximetry BMI (Body Mass Index)                92% 44.55 kg/m2          [employer] Information     [employer] lets you send messages to your doctor, view your test results, renew your prescriptions, schedule appointments and more. To sign up, go to [email or web address] . Click on \"Log in\" on the left side of the screen, which will take you to the Welcome page. Then click on \"Sign up Now\" on the right side of the page.     You will be asked to enter the access code listed below, as well as some personal information. Please follow the directions to create your username and password.     Your access code is: [email or web address]  Expires: 2018  4:28 PM     Your access code will  in 90 days. If you need help or a new code, please call your [facility] clinic or [phone].        Care EveryWhere ID     This is your Care EveryWhere ID. This could be used by other organizations to access your [facility] medical records  [id number]        Equal Access to Services     [facility] TAB AH: Hadii aad ku " des Antonio, waaxda luqadaha, qaybta kaalmada adeegmayrada, jolene cardoso. So [facility] [phone].    ATENCIÓN: Si florenciola toya, tiene a howe disposición servicios gratuitos de asistencia lingüística. Gian al [phone].    We comply with applicable federal civil rights laws and [place] laws. We do not discriminate on the basis of race, color, national origin, age, disability, sex, sexual orientation, or gender identity.               Review of your medicines      START taking        Dose / Directions    meclizine 12.5 MG tablet   Commonly known as:  ANTIVERT        Dose:  25 mg   Take 2 tablets (25 mg) by mouth 4 times daily as needed for dizziness   Quantity:  30 tablet   Refills:  0       ondansetron 4 MG tablet   Commonly known as:  ZOFRAN        Dose:  4 mg   Take 1 tablet (4 mg) by mouth every 6 hours as needed for nausea   Quantity:  20 tablet   Refills:  0       predniSONE 10 MG tablet   Commonly known as:  DELTASONE   Used for:  Vestibular neuronitis, unspecified laterality        Dose:  10 mg   Take 1 tablet (10 mg) by mouth daily   Quantity:  27 tablet   Refills:  0         CONTINUE these medicines which have NOT CHANGED        Dose / Directions    loratadine 10 MG tablet   Commonly known as:  CLARITIN        Dose:  10 mg   Take 10 mg by mouth daily   Refills:  0       triamcinolone 55 MCG/ACT inhaler   Commonly known as:  NASACORT        Dose:  1-2 spray   Spray 1-2 sprays into both nostrils daily as needed   Refills:  0            Where to get your medicines      These medications were sent to [facility] [address] [address]  [address] [facility] [address]     Phone:  [phone]     meclizine 12.5 MG tablet    ondansetron 4 MG tablet    predniSONE 10 MG tablet                Protect others around you: Learn how to safely use, store and throw away your medicines at www.disposemymeds.org.             Medication List:  This is a list of all your medications and when to take them. Check marks below indicate your daily home schedule. Keep this list as a reference.      Medications           Morning Afternoon Evening Bedtime As Needed    loratadine 10 MG tablet   Commonly known as:  CLARITIN   Take 10 mg by mouth daily   Last time this was given:  10 mg on 9/27/2018  8:56 AM                                meclizine 12.5 MG tablet   Commonly known as:  ANTIVERT   Take 2 tablets (25 mg) by mouth 4 times daily as needed for dizziness   Last time this was given:  25 mg on 9/27/2018 11:26 AM                                ondansetron 4 MG tablet   Commonly known as:  ZOFRAN   Take 1 tablet (4 mg) by mouth every 6 hours as needed for nausea                                predniSONE 10 MG tablet   Commonly known as:  DELTASONE   Take 1 tablet (10 mg) by mouth daily   Last time this was given:  60 mg on 9/27/2018  8:56 AM                                triamcinolone 55 MCG/ACT inhaler   Commonly known as:  NASACORT   Spray 1-2 sprays into both nostrils daily as needed

## 2018-09-25 NOTE — ED TRIAGE NOTES
"Pt with history of pressure behind right eye and dizzy spells. Pt is in the process of workup for symptoms. \" I just finished a course of Augmentin because she thought it might be related to my sinus.\" Pt felt a little dizzy when I got up at 0300 to le the dog out. When I got up a 0900 the dizziness was really bad.\" Pt reports increase in dizziness and room spinning with any movement of head. Pt reports head pressure and pressure behind right eye has improved. Nausea improved with zofran from EMS.   "

## 2018-09-25 NOTE — ED NOTES
Pt attempted to get up to BSC. Pt sitting at side of bed complains worsening dizziness. Unable to get up. Pt assisted with bedpan. HA worsened.

## 2018-09-25 NOTE — DISCHARGE INSTRUCTIONS
Benign Paroxysmal Positional Vertigo     Your health care provider may move your head in certain ways to treat your BPPV.     Benign paroxysmal positional vertigo (BPPV) is a problem with the inner ear. The inner ear contains the vestibular system. This system is what helps you keep your balance. BPPV causes a feeling of spinning. It is a common problem of the vestibular system.  Understanding the vestibular system  The vestibular system of the ear is made up of very tiny parts. They include the utricle, saccule, and semicircular canals. The utricle is a tiny organ that contains calcium crystals. In some people, the crystals can move into the semicircular canals. When this happens, the system no longer works as it should. This causes BPPV. Benign means it is not life threatening. Paroxysmal means it happens suddenly. Positional means that it happens when you move your head. Vertigo is a feeling of spinning.  What causes BPPV?  Causes include injury to your head or neck. Other problems with the vestibular system may cause BPPV. In many people, the cause of BPPV is not known.  Symptoms of BPPV  You many have repeated feelings of spinning (vertigo). The vertigo usually lasts less than 1 minute. Some movements, such as rolling over in bed, can bring on vertigo.  Diagnosing BPPV  Your primary healthcare provider may diagnose and treat your BPPV. Or you may see an ear, nose, and throat doctor (otolaryngologist). In some cases, you may see a nervous system doctor (neurologist).  The healthcare provider will ask about your symptoms and your medical history. He or she will examine you. You may have hearing and balance tests. As part of the exam, your healthcare provider may have you move your head and body in certain ways. If you have BPPV, the movements can bring on vertigo. Your provider will also look for abnormal movements of your eyes. You may have other tests to check your vestibular or nervous systems.  Treatment  for BPPV  Your healthcare provider may try to move the calcium crystals. This is done by having you move your head and neck in certain ways. This treatment is safe and often works well. You may also be told to do these movements at home. You may still have vertigo for a few weeks. Your healthcare provider will recheck your symptoms, usually in about a month. Special physical therapy may also be part of treatment. In rare cases, surgery may be needed for BPPV that does not go away.     When to call the healthcare provider  Call your healthcare provider right away if you have any of these:    Symptoms that do not go away with treatment    Symptoms that get worse    New symptoms   Date Last Reviewed: 5/1/2017 2000-2017 [employer]. [address] [place] [address], [place] [address]. All rights reserved. This information is not intended as a substitute for professional medical care. Always follow your healthcare professional's instructions.

## 2018-09-25 NOTE — ED AVS SNAPSHOT
[facility] Emergency Department    [address]    [facility] [address]    Phone:  [phone]    Fax:  [phone]                                       Lorraine Sepulveda   MRN: [id number]    Department:  [facility] Emergency Department   Date of Visit:  9/25/2018           After Visit Summary Signature Page     I have received my discharge instructions, and my questions have been answered. I have discussed any challenges I see with this plan with the nurse or doctor.    ..........................................................................................................................................  Patient/Patient Representative Signature      ..........................................................................................................................................  Patient Representative Print Name and Relationship to Patient    ..................................................               ................................................  Date                                   Time    ..........................................................................................................................................  Reviewed by Signature/Title    ...................................................              ..............................................  Date                                               Time          22EPIC Rev 08/18

## 2018-09-25 NOTE — IP AVS SNAPSHOT
[facility] Observation Department    [address]    [facility] [address]    Phone:  [phone]                                       After Visit Summary   9/25/2018    Lorraine Sepulveda    MRN: [id number]           After Visit Summary Signature Page     I have received my discharge instructions, and my questions have been answered. I have discussed any challenges I see with this plan with the nurse or doctor.    ..........................................................................................................................................  Patient/Patient Representative Signature      ..........................................................................................................................................  Patient Representative Print Name and Relationship to Patient    ..................................................               ................................................  Date                                   Time    ..........................................................................................................................................  Reviewed by Signature/Title    ...................................................              ..............................................  Date                                               Time          22EPIC Rev 08/18

## 2018-09-26 ENCOUNTER — APPOINTMENT (OUTPATIENT)
Dept: PHYSICAL THERAPY | Facility: CLINIC | Age: 48
End: 2018-09-26
Attending: PHYSICIAN ASSISTANT
Payer: COMMERCIAL

## 2018-09-26 PROCEDURE — 40000193 ZZH STATISTIC PT WARD VISIT

## 2018-09-26 PROCEDURE — 25000132 ZZH RX MED GY IP 250 OP 250 PS 637: Performed by: PHYSICIAN ASSISTANT

## 2018-09-26 PROCEDURE — G0378 HOSPITAL OBSERVATION PER HR: HCPCS

## 2018-09-26 PROCEDURE — 25000131 ZZH RX MED GY IP 250 OP 636 PS 637: Performed by: PHYSICIAN ASSISTANT

## 2018-09-26 PROCEDURE — 97530 THERAPEUTIC ACTIVITIES: CPT | Mod: GP

## 2018-09-26 PROCEDURE — 96361 HYDRATE IV INFUSION ADD-ON: CPT

## 2018-09-26 PROCEDURE — 99225 ZZC SUBSEQUENT OBSERVATION CARE,LEVEL II: CPT | Performed by: PHYSICIAN ASSISTANT

## 2018-09-26 PROCEDURE — 97161 PT EVAL LOW COMPLEX 20 MIN: CPT | Mod: GP

## 2018-09-26 PROCEDURE — 25000125 ZZHC RX 250: Performed by: PHYSICIAN ASSISTANT

## 2018-09-26 PROCEDURE — 97112 NEUROMUSCULAR REEDUCATION: CPT | Mod: GP

## 2018-09-26 PROCEDURE — 25000128 H RX IP 250 OP 636: Performed by: PHYSICIAN ASSISTANT

## 2018-09-26 RX ORDER — SODIUM CHLORIDE 9 MG/ML
INJECTION, SOLUTION INTRAVENOUS CONTINUOUS
Status: ACTIVE | OUTPATIENT
Start: 2018-09-26 | End: 2018-09-26

## 2018-09-26 RX ORDER — DIAZEPAM 5 MG
5 TABLET ORAL ONCE
Status: COMPLETED | OUTPATIENT
Start: 2018-09-26 | End: 2018-09-26

## 2018-09-26 RX ORDER — DIAZEPAM 2 MG
2 TABLET ORAL EVERY 6 HOURS PRN
Status: DISCONTINUED | OUTPATIENT
Start: 2018-09-26 | End: 2018-09-27 | Stop reason: HOSPADM

## 2018-09-26 RX ADMIN — DIAZEPAM 5 MG: 5 TABLET ORAL at 07:43

## 2018-09-26 RX ADMIN — LORATADINE 10 MG: 10 TABLET ORAL at 07:43

## 2018-09-26 RX ADMIN — MECLIZINE HYDROCHLORIDE 25 MG: 25 TABLET ORAL at 20:20

## 2018-09-26 RX ADMIN — MECLIZINE HYDROCHLORIDE 25 MG: 25 TABLET ORAL at 06:11

## 2018-09-26 RX ADMIN — ONDANSETRON 4 MG: 4 TABLET, ORALLY DISINTEGRATING ORAL at 13:41

## 2018-09-26 RX ADMIN — MECLIZINE HYDROCHLORIDE 25 MG: 25 TABLET ORAL at 11:30

## 2018-09-26 RX ADMIN — MECLIZINE HYDROCHLORIDE 25 MG: 25 TABLET ORAL at 23:31

## 2018-09-26 RX ADMIN — PREDNISONE 60 MG: 10 TABLET ORAL at 07:43

## 2018-09-26 RX ADMIN — DIAZEPAM 5 MG: 5 TABLET ORAL at 10:54

## 2018-09-26 RX ADMIN — SODIUM CHLORIDE: 9 INJECTION, SOLUTION INTRAVENOUS at 10:54

## 2018-09-26 NOTE — H&P
History and Physical     Lorraine Sepulveda MRN# [id number]   YOB: 1970 Age: 48 year old      Date of Admission:  9/25/2018    Primary care provider: Clinic, [facility]          Assessment and Plan:   Lorraine Sepulveda is a 48 year old female with a PMH significant for allergies and asthma who presents with vertigo and headache.    Patient was discussed with Mayo Hendrickson PA-C, who was provider in ED. Chart review of ED work up was reviewed as well as chart review of Care Everywhere, previous visits and admissions.     1.  Vertigo-BPPV versus vestibular neuritis  Patient presents with acute onset of room spinning vertigo along with headache, nausea and vomiting.  She had MRI and MRA imaging in the ED which was negative for central cause.  She has a history of influenza in February with 2 episodes of self-limited vertigo following this.  This episode is more severe with nausea and vomiting.  She feels some improvement after receiving Valium, Benadryl, Ativan, Reglan, and meclizine in the ED.  On exam she does not have significant nystagmus and denies significant ear pain.  I feel like her presentation is not consistent with BPPV and could be related to previous influenza or viral infection.  The patient is convinced she has a sinusitis but I called the radiologist and reviewed the MRI which is negative for sinusitis.  Therefore I will treat for vestibular neuritis at this time.   -Prednisone 60 mg orally  -We schedule meclizine and have Valium available as needed for vertigo  -Antinausea medications as needed  -In case these measures have not improved I placed a PT consult but this can be discontinued in the morning if the patient is doing better        Social: No concerns  Code: Discussed with patient and they have chosen full code  VTE prophylaxis: Ambulation  Disposition: Observation                    Chief Complaint:   Vertigo and headache         History of Present Illness:   Lorraine Sepulveda  is a 48 year old female who presents with acute onset of vertigo.  She states she had a normal day yesterday and this morning woke up with room spinning vertigo along with a headache in the frontal area of her head.  The symptoms have been very severe along with nausea and vomiting.  They are worse with any sort of head motion.  She has had 2 episodes of vertigo since February when she was diagnosed with influenza both have resolved within 1-2 days and if not been as severe.  She reports feeling slightly better now after antinausea medications.  She is requesting to eat.  She reports a constant frontal headache that she equates to sinusitis but was treated with Augmentin recently with no improvement.  She does not have significant nasal drainage, sore throat, increased cough or ear pain.  She denies fever.  Other than the influenza she has not been sick recently or started any new medications other than the Augmentin.              Past Medical History:     Past Medical History:   Diagnosis Date     Uncomplicated asthma                Past Surgical History:   History reviewed. No pertinent surgical history.            Social History:     Social History     Social History     Marital status:      Spouse name: N/A     Number of children: N/A     Years of education: N/A     Occupational History     Not on file.     Social History Main Topics     Smoking status: Never Smoker     Smokeless tobacco: Never Used     Alcohol use No     Drug use: No     Sexual activity: Not on file     Other Topics Concern     Not on file     Social History Narrative     No narrative on file               Family History:   Family history reviewed and is non contributory         Allergies:    No Known Allergies            Medications:     Prior to Admission medications    Medication Sig Last Dose Taking? Auth Provider   loratadine (CLARITIN) 10 MG tablet Take 10 mg by mouth daily 9/24/2018 at Unknown time Yes Unknown, Entered By History  "  meclizine (ANTIVERT) 12.5 MG tablet Take 2 tablets (25 mg) by mouth 4 times daily as needed for dizziness  Yes Graham Hendrickson PA-C   ondansetron (ZOFRAN) 4 MG tablet Take 1 tablet (4 mg) by mouth every 8 hours as needed for nausea  Yes Graham Hendrickson PA-C   triamcinolone (NASACORT) 55 MCG/ACT inhaler Spray 1-2 sprays into both nostrils daily as needed  Yes Unknown, Entered By History              Review of Systems:   A Comprehensive greater than 10 system review of systems was carried out.  Pertinent positives and negatives are noted above.  Otherwise negative for contributory information.            Physical Exam:   Blood pressure 123/69, pulse 64, temperature 97.4  F (36.3  C), temperature source Oral, resp. rate 20, height 1.676 m (5' 6\"), weight 125.2 kg (276 lb), SpO2 100 %.  Exam:  GENERAL:  Comfortable.  PSYCH: pleasant, oriented, No acute distress.  HEENT:  PERRLA. Normal conjunctiva, normal hearing, nasal mucosa and Oropharynx are normal.  No significant nystagmus.  Tympanic membranes are visualized and right tympanic membrane is normal and left tympanic membrane is slightly dull but no evidence of infection.  NECK:  Supple, no neck vein distention, adenopathy or bruits, normal thyroid.  HEART:  Normal S1, S2 with no murmur, no pericardial rub, gallops or S3 or S4.  LUNGS:  Clear to auscultation, normal Respiratory effort. No wheezing, rales or ronchi.  ABDOMEN:  Soft, no hepatosplenomegaly, normal bowel sounds. Non-tender, non distended.   EXTREMITIES:  No pedal edema, +2 pulses bilateral and equal.  SKIN:  Dry to touch, No rash, wound or ulcerations.  NEUROLOGIC:  CN 2-12 intact, BL 5/5 symmetric upper and lower extremity strength, sensation is intact with no focal deficits.               Data:       Recent Labs  Lab 09/25/18  1216   WBC 8.9   HGB 12.9   HCT 38.9   MCV 96          Recent Labs  Lab 09/25/18  1216      POTASSIUM 3.8   CHLORIDE 107   CO2 23   ANIONGAP 9   GLC " 104*   BUN 13   CR 0.72   GFRESTIMATED 86   GFRESTBLACK >90   LAKIA 8.6         Recent Results (from the past 24 hour(s))   MR Head w/o Contrast Angiogram    Narrative    MR ANGIOGRAM OF THE HEAD WITHOUT CONTRAST   9/25/2018 3:42 PM     HISTORY: Headache     TECHNIQUE: 3D time-of-flight MR angiogram of the head without  contrast.    COMPARISON: None.    FINDINGS: The bilateral intracranial vertebral arteries, basilar  artery, and posterior cerebral arteries are patent. The bilateral  intracranial internal carotid arteries, anterior cerebral arteries,  and middle cerebral arteries are patent. No aneurysms or vascular  reformations are identified. Patent bilateral posterior communicating  arteries are identified. Possible tiny patent anterior communicating  artery.      Impression    IMPRESSION: Normal MR angiogram of the head.        KENROY MYLES MD   MR Neck w/o & w Contrast Angiogram    Narrative    MRA NECK WITHOUT AND WITH CONTRAST  9/25/2018 3:42 PM     HISTORY: Headache.     TECHNIQUE: 2D time-of-flight MR angiogram of the neck without contrast  and 3D MR angiogram of the neck with 10 mL Gadavist. Estimates of  carotid stenoses are made relative to the distal internal carotid  artery diameters except as noted.    COMPARISON: None.    FINDINGS:    Normal origin of the great vessels from the aortic arch.    Right carotid artery: The right common and internal carotid arteries  are patent. No significant stenosis.      Left carotid artery: The left common and internal carotid arteries are  patent. No significant stenosis.      Vertebral arteries: Vertebral arteries appear patent without evidence  of dissection. No significant stenosis.        Impression    IMPRESSION:  Normal MR angiogram of the neck.        KENROY MYLES MD   MR Brain w/o & w Contrast    Narrative    MRI BRAIN WITHOUT AND WITH CONTRAST  9/25/2018 3:42 PM    HISTORY: Chronic persistent vertigo.      TECHNIQUE: Multiplanar, multisequence MRI of  the brain without and  with 10 mL Gadavist.    COMPARISON: None    FINDINGS: Mild volume loss is present commensurate with age. Few  frontoparietal predominant T2 FLAIR hyperintensities likely represent  chronic small vessel ischemic change commensurate with age. The  cerebral hemispheres, brainstem, and cerebellum otherwise demonstrate  normal morphology and attenuation. Subcentimeter left perivascular  space noted within the basilar ganglia. No evidence of acute ischemia,  hemorrhage, mass, mass effect, or hydrocephalus. No abnormal  enhancement or diffusion restriction is identified. Visualized cranial  nerves and turbinate bones are unremarkable. The visualized calvarium,  skull base, paranasal sinuses, and extracranial soft tissues are  unremarkable.      Impression    IMPRESSION: Unremarkable MRI of the head with and without contrast.    MD Anisa SAMANIEGO PA-C

## 2018-09-26 NOTE — PLAN OF CARE
Problem: Patient Care Overview  Goal: Plan of Care/Patient Progress Review  PRIMARY DIAGNOSIS: VERTIGO    OUTPATIENT/OBSERVATION GOALS TO BE MET BEFORE DISCHARGE  1. Orthostatic performed: No    2. Completion of appropriate imaging: Yes    3. Tolerating PO medications: Yes    4. Return to near baseline physical activity: No, SBA due  To dizziness    5. Cleared for discharge by consultants (if involved): No    Discharge Planner Nurse   Safe discharge environment identified: Yes  Barriers to discharge: No       Entered by: Aura Esposito 09/26/2018 9:38 AM     Patient is A&Ox4, VSS. Up SBA for safety because dizziness is so bad this morning. Dizzy with movement and at rest. Scheduled meclazine. PRN valium given this AM. Saline locked. Regular diet. PT at 1330. Will continue to monitor and assess.     Please review provider order for any additional goals.   Nurse to notify provider when observation goals have been met and patient is ready for discharge.

## 2018-09-26 NOTE — PLAN OF CARE
Problem: Patient Care Overview  Goal: Plan of Care/Patient Progress Review  PRIMARY DIAGNOSIS: VERTIGO    OUTPATIENT/OBSERVATION GOALS TO BE MET BEFORE DISCHARGE  1. Orthostatic performed: No    2. Completion of appropriate imaging: Yes    3. Tolerating PO medications: Yes    4. Return to near baseline physical activity: Yes    5. Cleared for discharge by consultants (if involved): No    Vitals are Temp: 98.5  F (36.9  C) Temp src: Oral BP: 105/51 Pulse: 67   Resp: 18 SpO2: 95 %.    Patient is Alert and Oriented x4. They are SBA with Activity.  Pt is a Regular diet and denying pain.  Patient is Saline locked.  Patient is feeling slightly dizzy after ambulating to the bathroom.  Plan of care is physical therapy consult in the morning.      Discharge Planner Nurse   Safe discharge environment identified: Yes  Barriers to discharge: No          Please review provider order for any additional goals.   Nurse to notify provider when observation goals have been met and patient is ready for discharge.

## 2018-09-26 NOTE — PLAN OF CARE
Problem: Patient Care Overview  Goal: Plan of Care/Patient Progress Review  PRIMARY DIAGNOSIS: VERTIGO     OUTPATIENT/OBSERVATION GOALS TO BE MET BEFORE DISCHARGE  1. Orthostatic performed: No     2. Completion of appropriate imaging: Yes     3. Tolerating PO medications: Yes     4. Return to near baseline physical activity: Yes     5. Cleared for discharge by consultants (if involved): No     Vitals are Temp: 97.9  F (36.6  C) Temp src: Oral BP: 130/69 Pulse: 85   Resp: 18 SpO2: 96 %  Patient is Alert and Oriented x4. They are SBA with Activity.  Pt is a Regular diet and denying pain.  Patient is Saline locked.  Patient is feeling slightly dizzy and nauseous after ambulating to the bathroom.  Plan of care is physical therapy consult in the morning.       Discharge Planner Nurse   Safe discharge environment identified: Yes  Barriers to discharge: No          Please review provider order for any additional goals.   Nurse to notify provider when observation goals have been met and patient is ready for discharge.

## 2018-09-26 NOTE — PROGRESS NOTES
"[facility]  Hospitalist Progress Note  Jacquelin Ivy PA-C 09/26/2018    Reason for Stay (Diagnosis): vertigo         Assessment and Plan:      Summary of Stay: Lorraine Sepulveda is a 48 year old female admitted on 9/25/2018 with vertigo.      Problem List:   1. Vertigo secondary to both vestibular neuritis and BPPV: Continues to have severe vertigo this afternoon preventing her from ambulating safely. MRI and MRA imaging on admission negative for central cause. History of influenza in 2/2018 with 2 episodes of self-limited vertigo following this, but neither of those episodes as severe as this one. She does not have significant nystagmus and no ear pain, tinnitus, or hearing loss. Vestibular PT evaluated today and state that she has both vestibular neuritis and BPPV. They want to re-assess and treat again tomorrow.  - Continue prednisone 10-day burst with taper (per UpToDate give 60 mg days 1-5, 50 mg day 6, 40 mg day 7, 20 mg day 8, 10 mg day 9, and 5 mg day 10)  - Continue scheduled meclizine  - Prn valium 2 mg Q6H prn (felt really sleepy with 5 mg tablet earlier today)  - Vestibular PT to assess tomorrow AM  - ADAT, supportive measures w/ IVF  - Will need OP referral to vestibular PT on discharge    DVT Prophylaxis: Low Risk/Ambulatory with no VTE prophylaxis indicated  Code Status: Full Code  Discharge Dispo: Anticipate        Interval History (Subjective):      Patient continues to feel dizzy (\"feels like everything is spinning, like I'm on a [id number]\") with head turning or rolling over in bed. She has recently been having URI-like symptoms and has seasonal allergies on top of that. No fever, chills, cp, sob, cough, vomiting, abdominal pain, or diarrhea. Intermittently nauseous secondary to dizziness throughout the day.                  Physical Exam:      Last Vital Signs:  /55 (BP Location: Left arm)  Pulse 85  Temp 97.6  F (36.4  C) (Oral)  Resp 18  Ht 1.676 m (5' 6\")  Wt 125.2 " kg (276 lb)  SpO2 96%  BMI 44.55 kg/m2    Results for orders placed or performed during the hospital encounter of 09/25/18   MR Brain w/o & w Contrast    Narrative    MRI BRAIN WITHOUT AND WITH CONTRAST  9/25/2018 3:42 PM    HISTORY: Chronic persistent vertigo.      TECHNIQUE: Multiplanar, multisequence MRI of the brain without and  with 10 mL Gadavist.    COMPARISON: None    FINDINGS: Mild volume loss is present commensurate with age. Few  frontoparietal predominant T2 FLAIR hyperintensities likely represent  chronic small vessel ischemic change commensurate with age. The  cerebral hemispheres, brainstem, and cerebellum otherwise demonstrate  normal morphology and attenuation. Subcentimeter left perivascular  space noted within the basilar ganglia. No evidence of acute ischemia,  hemorrhage, mass, mass effect, or hydrocephalus. No abnormal  enhancement or diffusion restriction is identified. Visualized cranial  nerves and turbinate bones are unremarkable. The visualized calvarium,  skull base, paranasal sinuses, and extracranial soft tissues are  unremarkable.      Impression    IMPRESSION: Unremarkable MRI of the head with and without contrast.    KENROY MYLES MD   MR Head w/o Contrast Angiogram    Narrative    MR ANGIOGRAM OF THE HEAD WITHOUT CONTRAST   9/25/2018 3:42 PM     HISTORY: Headache     TECHNIQUE: 3D time-of-flight MR angiogram of the head without  contrast.    COMPARISON: None.    FINDINGS: The bilateral intracranial vertebral arteries, basilar  artery, and posterior cerebral arteries are patent. The bilateral  intracranial internal carotid arteries, anterior cerebral arteries,  and middle cerebral arteries are patent. No aneurysms or vascular  reformations are identified. Patent bilateral posterior communicating  arteries are identified. Possible tiny patent anterior communicating  artery.      Impression    IMPRESSION: Normal MR angiogram of the head.        KENROY MYLES MD   MR Neck w/o & w  Contrast Angiogram    Narrative    MRA NECK WITHOUT AND WITH CONTRAST  9/25/2018 3:42 PM     HISTORY: Headache.     TECHNIQUE: 2D time-of-flight MR angiogram of the neck without contrast  and 3D MR angiogram of the neck with 10 mL Gadavist. Estimates of  carotid stenoses are made relative to the distal internal carotid  artery diameters except as noted.    COMPARISON: None.    FINDINGS:    Normal origin of the great vessels from the aortic arch.    Right carotid artery: The right common and internal carotid arteries  are patent. No significant stenosis.      Left carotid artery: The left common and internal carotid arteries are  patent. No significant stenosis.      Vertebral arteries: Vertebral arteries appear patent without evidence  of dissection. No significant stenosis.        Impression    IMPRESSION:  Normal MR angiogram of the neck.        KENROY MYLES MD   CBC with platelets differential   Result Value Ref Range    WBC 8.9 4.0 - 11.0 10e9/L    RBC Count 4.05 3.8 - 5.2 10e12/L    Hemoglobin 12.9 11.7 - 15.7 g/dL    Hematocrit 38.9 35.0 - 47.0 %    MCV 96 78 - 100 fl    MCH 31.9 26.5 - 33.0 pg    MCHC 33.2 31.5 - 36.5 g/dL    RDW 13.3 10.0 - 15.0 %    Platelet Count 325 150 - 450 10e9/L    Diff Method Automated Method     % Neutrophils 80.4 %    % Lymphocytes 13.0 %    % Monocytes 4.6 %    % Eosinophils 1.1 %    % Basophils 0.6 %    % Immature Granulocytes 0.3 %    Nucleated RBCs 0 0 /100    Absolute Neutrophil 7.2 1.6 - 8.3 10e9/L    Absolute Lymphocytes 1.2 0.8 - 5.3 10e9/L    Absolute Monocytes 0.4 0.0 - 1.3 10e9/L    Absolute Eosinophils 0.1 0.0 - 0.7 10e9/L    Absolute Basophils 0.1 0.0 - 0.2 10e9/L    Abs Immature Granulocytes 0.0 0 - 0.4 10e9/L    Absolute Nucleated RBC 0.0    Basic metabolic panel   Result Value Ref Range    Sodium 139 133 - 144 mmol/L    Potassium 3.8 3.4 - 5.3 mmol/L    Chloride 107 94 - 109 mmol/L    Carbon Dioxide 23 20 - 32 mmol/L    Anion Gap 9 3 - 14 mmol/L    Glucose 104  (H) 70 - 99 mg/dL    Urea Nitrogen 13 7 - 30 mg/dL    Creatinine 0.72 0.52 - 1.04 mg/dL    GFR Estimate 86 >60 mL/min/1.7m2    GFR Estimate If Black >90 >60 mL/min/1.7m2    Calcium 8.6 8.5 - 10.1 mg/dL   HCG quantitative pregnancy   Result Value Ref Range    HCG Quantitative Serum <1 0 - 5 IU/L   ISTAT HCG Quantitative Pregnancy POCT   Result Value Ref Range    HCG Quantitative Serum 6.0 (H) <5.0 IU/L       Constitutional: Awake, alert, cooperative, no apparent distress   Respiratory: Clear to auscultation bilaterally, no crackles or wheezing   Cardiovascular: Regular rate and rhythm, normal S1 and S2, and no murmur noted   Abdomen: Normal bowel sounds, soft, non-distended, non-tender   Skin: No rashes, no cyanosis, dry to touch   Neuro: Alert and oriented x3, no weakness, numbness, memory loss   Extremities: No edema, normal range of motion   Other(s):        All other systems: Negative          Medications:      All current medications were reviewed with changes reflected in problem list.         Data:      All new lab and imaging data was reviewed.   Labs & Imaging:  Results for orders placed or performed during the hospital encounter of 09/25/18   MR Brain w/o & w Contrast    Narrative    MRI BRAIN WITHOUT AND WITH CONTRAST  9/25/2018 3:42 PM    HISTORY: Chronic persistent vertigo.      TECHNIQUE: Multiplanar, multisequence MRI of the brain without and  with 10 mL Gadavist.    COMPARISON: None    FINDINGS: Mild volume loss is present commensurate with age. Few  frontoparietal predominant T2 FLAIR hyperintensities likely represent  chronic small vessel ischemic change commensurate with age. The  cerebral hemispheres, brainstem, and cerebellum otherwise demonstrate  normal morphology and attenuation. Subcentimeter left perivascular  space noted within the basilar ganglia. No evidence of acute ischemia,  hemorrhage, mass, mass effect, or hydrocephalus. No abnormal  enhancement or diffusion restriction is  identified. Visualized cranial  nerves and turbinate bones are unremarkable. The visualized calvarium,  skull base, paranasal sinuses, and extracranial soft tissues are  unremarkable.      Impression    IMPRESSION: Unremarkable MRI of the head with and without contrast.    KENROY MYLES MD   MR Head w/o Contrast Angiogram    Narrative    MR ANGIOGRAM OF THE HEAD WITHOUT CONTRAST   9/25/2018 3:42 PM     HISTORY: Headache     TECHNIQUE: 3D time-of-flight MR angiogram of the head without  contrast.    COMPARISON: None.    FINDINGS: The bilateral intracranial vertebral arteries, basilar  artery, and posterior cerebral arteries are patent. The bilateral  intracranial internal carotid arteries, anterior cerebral arteries,  and middle cerebral arteries are patent. No aneurysms or vascular  reformations are identified. Patent bilateral posterior communicating  arteries are identified. Possible tiny patent anterior communicating  artery.      Impression    IMPRESSION: Normal MR angiogram of the head.        KENROY MYLES MD   MR Neck w/o & w Contrast Angiogram    Narrative    MRA NECK WITHOUT AND WITH CONTRAST  9/25/2018 3:42 PM     HISTORY: Headache.     TECHNIQUE: 2D time-of-flight MR angiogram of the neck without contrast  and 3D MR angiogram of the neck with 10 mL Gadavist. Estimates of  carotid stenoses are made relative to the distal internal carotid  artery diameters except as noted.    COMPARISON: None.    FINDINGS:    Normal origin of the great vessels from the aortic arch.    Right carotid artery: The right common and internal carotid arteries  are patent. No significant stenosis.      Left carotid artery: The left common and internal carotid arteries are  patent. No significant stenosis.      Vertebral arteries: Vertebral arteries appear patent without evidence  of dissection. No significant stenosis.        Impression    IMPRESSION:  Normal MR angiogram of the neck.        KENROY MYLES MD   CBC with  platelets differential   Result Value Ref Range    WBC 8.9 4.0 - 11.0 10e9/L    RBC Count 4.05 3.8 - 5.2 10e12/L    Hemoglobin 12.9 11.7 - 15.7 g/dL    Hematocrit 38.9 35.0 - 47.0 %    MCV 96 78 - 100 fl    MCH 31.9 26.5 - 33.0 pg    MCHC 33.2 31.5 - 36.5 g/dL    RDW 13.3 10.0 - 15.0 %    Platelet Count 325 150 - 450 10e9/L    Diff Method Automated Method     % Neutrophils 80.4 %    % Lymphocytes 13.0 %    % Monocytes 4.6 %    % Eosinophils 1.1 %    % Basophils 0.6 %    % Immature Granulocytes 0.3 %    Nucleated RBCs 0 0 /100    Absolute Neutrophil 7.2 1.6 - 8.3 10e9/L    Absolute Lymphocytes 1.2 0.8 - 5.3 10e9/L    Absolute Monocytes 0.4 0.0 - 1.3 10e9/L    Absolute Eosinophils 0.1 0.0 - 0.7 10e9/L    Absolute Basophils 0.1 0.0 - 0.2 10e9/L    Abs Immature Granulocytes 0.0 0 - 0.4 10e9/L    Absolute Nucleated RBC 0.0    Basic metabolic panel   Result Value Ref Range    Sodium 139 133 - 144 mmol/L    Potassium 3.8 3.4 - 5.3 mmol/L    Chloride 107 94 - 109 mmol/L    Carbon Dioxide 23 20 - 32 mmol/L    Anion Gap 9 3 - 14 mmol/L    Glucose 104 (H) 70 - 99 mg/dL    Urea Nitrogen 13 7 - 30 mg/dL    Creatinine 0.72 0.52 - 1.04 mg/dL    GFR Estimate 86 >60 mL/min/1.7m2    GFR Estimate If Black >90 >60 mL/min/1.7m2    Calcium 8.6 8.5 - 10.1 mg/dL   HCG quantitative pregnancy   Result Value Ref Range    HCG Quantitative Serum <1 0 - 5 IU/L   ISTAT HCG Quantitative Pregnancy POCT   Result Value Ref Range    HCG Quantitative Serum 6.0 (H) <5.0 IU/L

## 2018-09-26 NOTE — PLAN OF CARE
Problem: Patient Care Overview  Goal: Plan of Care/Patient Progress Review  Problem: Patient Care Overview  Goal: Plan of Care/Patient Progress Review  PRIMARY DIAGNOSIS: VERTIGO     OUTPATIENT/OBSERVATION GOALS TO BE MET BEFORE DISCHARGE  1. Orthostatic performed: No     2. Completion of appropriate imaging: Yes     3. Tolerating PO medications: Yes     4. Return to near baseline physical activity: No, SBA due  To dizziness     5. Cleared for discharge by consultants (if involved): No     Discharge Planner Nurse   Safe discharge environment identified: Yes  Barriers to discharge: No     Patient is A&Ox4, VSS. Up SBA for safety because dizziness is still very bad. Dizzy with movement and at rest. Scheduled meclazine. PRN valium given this AM x2 per PA. Saline locked. Regular diet. PT at 1330. Will continue to monitor and assess.  Please review provider order for any additional goals.   Nurse to notify provider when observation goals have been met and patient is ready for discharge.

## 2018-09-26 NOTE — PLAN OF CARE
Problem: Patient Care Overview  Goal: Plan of Care/Patient Progress Review  Outcome: No Change  PRIMARY DIAGNOSIS: VERTIGO    OUTPATIENT/OBSERVATION GOALS TO BE MET BEFORE DISCHARGE  1. Orthostatic performed: No    2. Completion of appropriate imaging: Yes    3. Tolerating PO medications: Yes    4. Return to near baseline physical activity: No, SBA d/t dizziness    5. Cleared for discharge by consultants (if involved): No, possible PT consult in am    Discharge Planner Nurse   Safe discharge environment identified: Yes  Barriers to discharge: No       Entered by: Shanel Soto 09/25/2018 8:00p.m.     Please review provider order for any additional goals.   Nurse to notify provider when observation goals have been met and patient is ready for discharge.    AOx3. SBA for transfers-able to stand and pivot to bed upon admit (reports only slight dizziness-and no nausea with movement).  Reporting head pressure-eye pressure behind R eye-now moderate, improved from severe earlier.  Denies nausea at this time-no emesis.  Reports feeling dizzy with movement still-tolerable with head straight-no movement.  Plan for scheduled antivert, prednisone, and claritin with PRN valium and antiemetics and possible PT consult in am if not improving. VSS

## 2018-09-26 NOTE — PLAN OF CARE
Problem: Patient Care Overview  Goal: Plan of Care/Patient Progress Review  PT: Patient seen by physical therapy for evaluation and treatment.  Patient admitted with vertigo and headache.  Patient reported an acute onset of room spinning vertigo causing severe nausea and vomiting.  Patient with history of vertigo following influenza in February 2018, has been followed by OP vestibular therapy.  Patient reports hx on sinusitis at the end of August 2018.  Patient reports that all movement (especially sitting, standing and walking) cause increased spinning which has limited mobility.  Patient lives with  and children in 2 story home, 4 steps to enter.    Discharge Planner PT   Patient plan for discharge: home  Current status: Patient with increased and severe symptoms with transfers into sitting, standing.  Required min A with ambulation and toilet transfers for safety.  Patient tests positive for L sided BPPV.  Patient completed Epley maneuver x 2 reps with nystagmus and increased symptoms noted in all positions during both repetition, however length and severity of symptoms decreased on second repetition.  Patient returned to reclined sitting in bed, educated to avoid head movement for at least an hour, can trial mobility with nursing later to assess for ongoing dizziness with mobility.    Barriers to return to prior living situation: remains unsteady with gait secondary to dizziness requiring assist with all moiblity  Recommendations for discharge: Home with OP vestibular rehab  Rationale for recommendations: Patient presents with symptoms suggestive of both vestibular neuritis (history of recent illness, previous vertigo episode following illness) and BPPV (nystagmus and increased symptoms during Gayla- Hallpike assessment, reduced symptoms following Epley maneuver).  Recommend medical management for vestibular neuritis, a second vestibular PT session tomorrow morning for further Eply maneuver to address BPPV  symptoms, and OP vestibular rehab to address residual dizziness/episodes of BPPV at discharge.           Entered by: Annie Porras 09/26/2018 6:49 PM

## 2018-09-26 NOTE — PLAN OF CARE
Problem: Patient Care Overview  Goal: Plan of Care/Patient Progress Review  ROOM # 202-1    Living Situation (if not independent, order SW consult): House with   Facility name:N/A  : [facility] () #[phone]    Activity level at baseline: Ind  Activity level on admit: SBA      Patient registered to observation; given Patient Bill of Rights; given the opportunity to ask questions about observation status and their plan of care.  Patient has been oriented to the observation room, bathroom and call light is in place.    Discussed discharge goals and expectations with patient/family.

## 2018-09-26 NOTE — PLAN OF CARE
Problem: Patient Care Overview  Goal: Plan of Care/Patient Progress Review  PRIMARY DIAGNOSIS: VERTIGO      OUTPATIENT/OBSERVATION GOALS TO BE MET BEFORE DISCHARGE  1. Orthostatic performed: No      2. Completion of appropriate imaging: Yes      3. Tolerating PO medications: Yes      4. Return to near baseline physical activity: No, SBA due  To dizziness      5. Cleared for discharge by consultants (if involved): No      Discharge Planner Nurse   Safe discharge environment identified: Yes  Barriers to discharge: No     Patient is A&Ox4, VSS. Up SBA for safety because dizziness is still very bad. Dizzy with movement and at rest. Scheduled meclazine. Valium makes patient tired, not helpful for dizziness. IV infiltrated and was removed. Regular diet. Will continue to monitor and provide cares.     Please review provider order for any additional goals. Nurse to notify provider when observation goals have been met and patient is ready for discharge.

## 2018-09-27 ENCOUNTER — APPOINTMENT (OUTPATIENT)
Dept: PHYSICAL THERAPY | Facility: CLINIC | Age: 48
End: 2018-09-27
Payer: COMMERCIAL

## 2018-09-27 VITALS
HEIGHT: 66 IN | RESPIRATION RATE: 16 BRPM | BODY MASS INDEX: 44.36 KG/M2 | HEART RATE: 66 BPM | DIASTOLIC BLOOD PRESSURE: 75 MMHG | SYSTOLIC BLOOD PRESSURE: 126 MMHG | OXYGEN SATURATION: 92 % | TEMPERATURE: 96.1 F | WEIGHT: 276 LBS

## 2018-09-27 LAB — B-HCG SERPL-ACNC: <1 IU/L (ref 0–5)

## 2018-09-27 PROCEDURE — 84703 CHORIONIC GONADOTROPIN ASSAY: CPT | Performed by: PHYSICIAN ASSISTANT

## 2018-09-27 PROCEDURE — 97112 NEUROMUSCULAR REEDUCATION: CPT | Mod: GP | Performed by: PHYSICAL THERAPIST

## 2018-09-27 PROCEDURE — 97530 THERAPEUTIC ACTIVITIES: CPT | Mod: GP | Performed by: PHYSICAL THERAPIST

## 2018-09-27 PROCEDURE — 40000193 ZZH STATISTIC PT WARD VISIT: Performed by: PHYSICAL THERAPIST

## 2018-09-27 PROCEDURE — 25000131 ZZH RX MED GY IP 250 OP 636 PS 637: Performed by: PHYSICIAN ASSISTANT

## 2018-09-27 PROCEDURE — G0378 HOSPITAL OBSERVATION PER HR: HCPCS

## 2018-09-27 PROCEDURE — 95992 CANALITH REPOSITIONING PROC: CPT | Mod: GP | Performed by: PHYSICAL THERAPIST

## 2018-09-27 PROCEDURE — 99217 ZZC OBSERVATION CARE DISCHARGE: CPT | Performed by: PHYSICIAN ASSISTANT

## 2018-09-27 PROCEDURE — 36415 COLL VENOUS BLD VENIPUNCTURE: CPT | Performed by: PHYSICIAN ASSISTANT

## 2018-09-27 PROCEDURE — 84702 CHORIONIC GONADOTROPIN TEST: CPT | Performed by: PHYSICIAN ASSISTANT

## 2018-09-27 PROCEDURE — 25000125 ZZHC RX 250: Performed by: PHYSICIAN ASSISTANT

## 2018-09-27 PROCEDURE — 25000132 ZZH RX MED GY IP 250 OP 250 PS 637: Performed by: PHYSICIAN ASSISTANT

## 2018-09-27 RX ORDER — ONDANSETRON 4 MG/1
4 TABLET, FILM COATED ORAL EVERY 6 HOURS PRN
Qty: 20 TABLET | Refills: 0 | Status: SHIPPED | OUTPATIENT
Start: 2018-09-27

## 2018-09-27 RX ORDER — PREDNISONE 10 MG/1
10 TABLET ORAL DAILY
Qty: 27 TABLET | Refills: 0 | Status: SHIPPED | OUTPATIENT
Start: 2018-09-27 | End: 2020-09-01

## 2018-09-27 RX ORDER — MECLIZINE HCL 12.5 MG 12.5 MG/1
25 TABLET ORAL 4 TIMES DAILY PRN
Qty: 30 TABLET | Refills: 0 | Status: SHIPPED | OUTPATIENT
Start: 2018-09-27

## 2018-09-27 RX ADMIN — LORATADINE 10 MG: 10 TABLET ORAL at 08:56

## 2018-09-27 RX ADMIN — ONDANSETRON 4 MG: 4 TABLET, ORALLY DISINTEGRATING ORAL at 11:26

## 2018-09-27 RX ADMIN — PREDNISONE 60 MG: 10 TABLET ORAL at 08:56

## 2018-09-27 RX ADMIN — SENNOSIDES AND DOCUSATE SODIUM 1 TABLET: 8.6; 5 TABLET ORAL at 03:29

## 2018-09-27 RX ADMIN — MECLIZINE HYDROCHLORIDE 25 MG: 25 TABLET ORAL at 11:26

## 2018-09-27 RX ADMIN — MECLIZINE HYDROCHLORIDE 25 MG: 25 TABLET ORAL at 06:23

## 2018-09-27 NOTE — PLAN OF CARE
Problem: Patient Care Overview  Goal: Plan of Care/Patient Progress Review  PRIMARY DIAGNOSIS: VERTIGO     OUTPATIENT/OBSERVATION GOALS TO BE MET BEFORE DISCHARGE  1. Orthostatic performed: No     2. Completion of appropriate imaging: Yes     3. Tolerating PO medications: Yes     4. Return to near baseline physical activity: Yes     5. Cleared for discharge by consultants (if involved): No     Vitals are Temp: 97.8  F (36.6  C) Temp src: Oral BP: 126/70 Pulse: 65   Resp: 16 SpO2: 90 %  Patient is Alert and Oriented x4. They are SBA with Activity.  Pt is a Regular diet and denying pain.  Patient has no IV access.  Pt is denying nausea.  Pt stated her dizziness is present but much improved since she had the BPPV done.  Plan of care is for PT to do the BPPV again in the morning.     Discharge Planner Nurse   Safe discharge environment identified: Yes  Barriers to discharge: No          Please review provider order for any additional goals.   Nurse to notify provider when observation goals have been met and patient is ready for discharge.

## 2018-09-27 NOTE — PROGRESS NOTES
09/26/18 1450   Quick Adds   Quick Adds Vestibular Eval   Living Environment   Lives With child(roseanna), dependent;spouse   Living Arrangements house   Home Accessibility bed and bath are not on the first floor;stairs to enter home;stairs within home   Number of Stairs to Enter Home 4   Number of Stairs Within Home 16   Transportation Available car;family or friend will provide   Self-Care   Usual Activity Tolerance good   Current Activity Tolerance poor  (secondary to dizziness)   Activity/Exercise/Self-Care Comment Patient is a    Functional Level Prior   Ambulation 0-->independent   Transferring 0-->independent   Toileting 0-->independent   Bathing 0-->independent   Dressing 0-->independent   Eating 0-->independent   Communication 0-->understands/communicates without difficulty   Swallowing 0-->swallows foods/liquids without difficulty   Cognition 0 - no cognition issues reported   Fall history within last six months no   Which of the above functional risks had a recent onset or change? ambulation;transferring   Prior Functional Level Comment Patient independent prior to admission   General Information   Onset of Illness/Injury or Date of Surgery - Date 09/25/18   Referring Physician Anisa Garcia PA-C   Patient/Family Goals Statement decreased dizziness, improved mobility   Pertinent History of Current Problem (include personal factors and/or comorbidities that impact the POC) Patient admitted with vertigo and headache.  Patient reported an acute onset of room spinning vertigo causing severe nausea and vomiting.  Patient with history of vertigo following influenza in February 2018, has been followed by OP vestibular therapy.  Patient reports that all movement (especially sitting, standing and walking) cause increased spinning which has limited mobility.     Precautions/Limitations fall precautions   Cognitive Status Examination   Orientation orientation to person, place and time    Level of Consciousness alert   Personal Safety and Judgment intact   Memory intact   Pain Assessment   Patient Currently in Pain No   Integumentary/Edema   Integumentary/Edema no deficits were identifed   Posture    Posture Forward head position;Protracted shoulders   Range of Motion (ROM)   ROM Comment WFL   Strength   Strength Comments WFL   Bed Mobility   Bed Mobility Comments independent   Transfer Skills   Transfer Comments CGA   Gait   Gait Comments CGA   Balance   Balance Comments Patient with mild unsteadiness with gait reuqiring assist secondary to dizziness   Sensory Examination   Sensory Perception no deficits were identified   Coordination   Coordination no deficits were identified   Muscle Tone   Muscle Tone no deficits were identified   Cervicogenic Screen   Neck ROM WFL   Oculomotor Exam   Smooth Pursuit Normal   Saccades Normal   VOR Normal   Infrared Goggle Exam or Frenzel Lense Exam   Exam completed with Room Light   Spontaneous Nystagmus Negative   Gayla-Hallpike (Left) Upbeating L torsional   General Therapy Interventions   Planned Therapy Interventions balance training;gait training;neuromuscular re-education;transfer training;home program guidelines;progressive activity/exercise   Clinical Impression   Criteria for Skilled Therapeutic Intervention yes, treatment indicated   PT Diagnosis decreased independence with mobility   Influenced by the following impairments dizziness   Functional limitations due to impairments difficulty with transfers and ambulation   Clinical Presentation Stable/Uncomplicated   Clinical Presentation Rationale non complex pmh, stable presentation, good social support   Clinical Decision Making (Complexity) Low complexity   Therapy Frequency` daily   Predicted Duration of Therapy Intervention (days/wks) 2-3 days   Anticipated Discharge Disposition Home with Outpatient Therapy   Risk & Benefits of therapy have been explained Yes   Patient, Family & other staff in  "agreement with plan of care Yes   [facility] AM-PAC  \"6 Clicks\" V.2 Basic Mobility Inpatient Short Form   1. Turning from your back to your side while in a flat bed without using bedrails? 4 - None   2. Moving from lying on your back to sitting on the side of a flat bed without using bedrails? 4 - None   3. Moving to and from a bed to a chair (including a wheelchair)? 3 - A Little   4. Standing up from a chair using your arms (e.g., wheelchair, or bedside chair)? 3 - A Little   5. To walk in hospital room? 3 - A Little   6. Climbing 3-5 steps with a railing? 3 - A Little   Basic Mobility Raw Score (Score out of 24.Lower scores equate to lower levels of function) 20   Total Evaluation Time   Total Evaluation Time (Minutes) 15     "

## 2018-09-27 NOTE — DISCHARGE SUMMARY
[facility] Outpatient / Observation Unit  Discharge Summary        Lorraine Sepulveda MRN# [id number]   YOB: 1970 Age: 48 year old     Date of Admission:  9/25/2018  Date of Discharge:  9/27/2018  Admitting Physician:  Terry Dubon MD  Discharge Physician: Heidi Griffin PA-C  Discharging Service: Hospitalist      Primary Provider: Clinic, [facility]  Primary Care Physician Phone Number: [phone]         Primary Discharge Diagnoses:    Lorraine Sepulveda was admitted on 9/25/2018 with complaints of dizziness and spinning c/w vertigo.     1. Acute vertigo--in the setting of recent URI. Sxs c/w vestibular neuritis (improved with steroids) and tested positive for BPPV.           Secondary Discharge Diagnoses:     Past Medical History:   Diagnosis Date     Uncomplicated asthma                 Code Status:      Full Code        Brief Hospital Summary:       Reason for your hospital stay      You were admitted for concerns of dizziness. Your MRI imaging was   negative for acute stroke. Your symptoms are due to vestibular neuritis   and vertigo. You will need to take prednisone and anti-dizziness   medications as needed.        Please refer to initial admission history and physical for further details.   Briefly, Lorraine Sepulveda was admitted on 9/25/2018 for complaints of dizziness and spinning sensation consistent with Vertigo.   Initial work up in the ED revealed normal electrolytes and no infectious etiology. MRI Imaging was performed and was normal. EKG did not reveal evidence of significant cardiac arrhythmias or ischemia.  Pt was registered to the Observation Unit for further evaluation.     Pt was initiated on oral meclizine and IVF. Other supportive measures including anti-emetics, benzodiazepines were offered. PT was consulted and per their evaluation they felt that she also had some component of BPPV as well. She did get better with steroids and vestibular rehab. She will be referred for  outpatient vestibular rehab.   On the day of discharge, patient symptoms improved, vitals remained stable and pt was deemed safe for discharge. Medications were reviewed and adjustments made as necessary. Pt is instructed to follow up as below.           Significant Lab During Hospitalization:        Recent Labs  Lab 09/25/18  1216   WBC 8.9   HGB 12.9   HCT 38.9   MCV 96          Recent Labs  Lab 09/25/18  1216      POTASSIUM 3.8   CHLORIDE 107   CO2 23   ANIONGAP 9   *   BUN 13   CR 0.72   GFRESTIMATED 86   GFRESTBLACK >90   LAKIA 8.6                Significant Imaging During Hospitalization:      Results for orders placed or performed during the hospital encounter of 09/25/18   MR Brain w/o & w Contrast    Narrative    MRI BRAIN WITHOUT AND WITH CONTRAST  9/25/2018 3:42 PM    HISTORY: Chronic persistent vertigo.      TECHNIQUE: Multiplanar, multisequence MRI of the brain without and  with 10 mL Gadavist.    COMPARISON: None    FINDINGS: Mild volume loss is present commensurate with age. Few  frontoparietal predominant T2 FLAIR hyperintensities likely represent  chronic small vessel ischemic change commensurate with age. The  cerebral hemispheres, brainstem, and cerebellum otherwise demonstrate  normal morphology and attenuation. Subcentimeter left perivascular  space noted within the basilar ganglia. No evidence of acute ischemia,  hemorrhage, mass, mass effect, or hydrocephalus. No abnormal  enhancement or diffusion restriction is identified. Visualized cranial  nerves and turbinate bones are unremarkable. The visualized calvarium,  skull base, paranasal sinuses, and extracranial soft tissues are  unremarkable.      Impression    IMPRESSION: Unremarkable MRI of the head with and without contrast.    KENROY MYLES MD   MR Head w/o Contrast Angiogram    Narrative    MR ANGIOGRAM OF THE HEAD WITHOUT CONTRAST   9/25/2018 3:42 PM     HISTORY: Headache     TECHNIQUE: 3D time-of-flight MR angiogram  of the head without  contrast.    COMPARISON: None.    FINDINGS: The bilateral intracranial vertebral arteries, basilar  artery, and posterior cerebral arteries are patent. The bilateral  intracranial internal carotid arteries, anterior cerebral arteries,  and middle cerebral arteries are patent. No aneurysms or vascular  reformations are identified. Patent bilateral posterior communicating  arteries are identified. Possible tiny patent anterior communicating  artery.      Impression    IMPRESSION: Normal MR angiogram of the head.        KENROY MYLES MD   MR Neck w/o & w Contrast Angiogram    Narrative    MRA NECK WITHOUT AND WITH CONTRAST  9/25/2018 3:42 PM     HISTORY: Headache.     TECHNIQUE: 2D time-of-flight MR angiogram of the neck without contrast  and 3D MR angiogram of the neck with 10 mL Gadavist. Estimates of  carotid stenoses are made relative to the distal internal carotid  artery diameters except as noted.    COMPARISON: None.    FINDINGS:    Normal origin of the great vessels from the aortic arch.    Right carotid artery: The right common and internal carotid arteries  are patent. No significant stenosis.      Left carotid artery: The left common and internal carotid arteries are  patent. No significant stenosis.      Vertebral arteries: Vertebral arteries appear patent without evidence  of dissection. No significant stenosis.        Impression    IMPRESSION:  Normal MR angiogram of the neck.        KENROY MYLES MD              Pending Results:        Unresulted Labs Ordered in the Past 30 Days of this Admission     No orders found from 7/27/2018 to 9/26/2018.              Consultations This Hospital Stay:      Consultation during this admission received from Physical Therapy         Discharge Instructions and Follow-Up:      Follow up with primary care provider, [facility], within   7 days for hospital follow- up.  No follow up labs or test are needed.              Discharge  "Disposition:      Discharged to home         Discharge Medications:        Current Discharge Medication List      START taking these medications    Details   meclizine (ANTIVERT) 12.5 MG tablet Take 2 tablets (25 mg) by mouth 4 times daily as needed for dizziness  Qty: 30 tablet, Refills: 0    Associated Diagnoses: Vertigo      ondansetron (ZOFRAN) 4 MG tablet Take 1 tablet (4 mg) by mouth every 6 hours as needed for nausea  Qty: 20 tablet, Refills: 0    Associated Diagnoses: Vertigo      predniSONE (DELTASONE) 10 MG tablet Take 1 tablet (10 mg) by mouth daily  Qty: 27 tablet, Refills: 0    Comments: 60mg for 2 more days then 50mg x1, 40mg x1, 30mg x1, 20mg x1, 10mg x1 then stop  Associated Diagnoses: Vestibular neuronitis, unspecified laterality         CONTINUE these medications which have NOT CHANGED    Details   loratadine (CLARITIN) 10 MG tablet Take 10 mg by mouth daily      triamcinolone (NASACORT) 55 MCG/ACT inhaler Spray 1-2 sprays into both nostrils daily as needed                 Allergies:       No Known Allergies        Condition and Physical on Discharge:      Discharge condition: Stable   Vitals: Blood pressure 126/66, pulse 65, temperature 96.7  F (35.9  C), temperature source Oral, resp. rate 16, height 1.676 m (5' 6\"), weight 125.2 kg (276 lb), SpO2 93 %.  276 lbs 0 oz      GENERAL:  Comfortable.  PSYCH: pleasant, oriented, No acute distress. No nystagmus  HEENT:  PERRLA. Normal conjunctiva, normal hearing, nasal mucosa and Oropharynx are normal.  NECK:  Supple, no neck vein distention, adenopathy or bruits, normal thyroid.  HEART:  Normal S1, S2 with no murmur, no pericardial rub, gallops or S3 or S4.  LUNGS:  Clear to auscultation, normal Respiratory effort. No wheezing, rales or ronchi.  ABDOMEN:  Soft, no hepatosplenomegaly, normal bowel sounds. Non-tender, non distended.   EXTREMITIES:  No pedal edema, +2 pulses bilateral and equal.  SKIN:  Dry to touch, No rash, wound or " ulcerations.  NEUROLOGIC:  CN 2-12 intact, BL 5/5 symmetric upper and lower extremity strength, sensation is intact with no focal deficits.

## 2018-09-27 NOTE — PLAN OF CARE
Problem: Patient Care Overview  Goal: Plan of Care/Patient Progress Review  PRIMARY DIAGNOSIS: VERTIGO    OUTPATIENT/OBSERVATION GOALS TO BE MET BEFORE DISCHARGE  1. Orthostatic performed: No    2. Completion of appropriate imaging: Yes    3. Tolerating PO medications: Yes    4. Return to near baseline physical activity: Yes    5. Cleared for discharge by consultants (if involved): No    Vitals are Temp: 98  F (36.7  C) Temp src: Oral BP: 123/60 Pulse: 87   Resp: 16 SpO2: 90 %.    Patient is Alert and Oriented x4. They are SBA with Activity.  Pt is a Regular diet and denying pain.  Patient has no IV access.  Pt is denying nausea.  Pt stated her dizziness is present but much improved since she had the BPPV done.  Plan of care is for PT to do the BPPV again in the morning.    Discharge Planner Nurse   Safe discharge environment identified: Yes  Barriers to discharge: No          Please review provider order for any additional goals.   Nurse to notify provider when observation goals have been met and patient is ready for discharge.

## 2018-09-27 NOTE — PLAN OF CARE
Problem: Patient Care Overview  Goal: Plan of Care/Patient Progress Review  Problem: Patient Care Overview  Goal: Plan of Care/Patient Progress Review  PRIMARY DIAGNOSIS: VERTIGO     OUTPATIENT/OBSERVATION GOALS TO BE MET BEFORE DISCHARGE  1. Orthostatic performed: No     2. Completion of appropriate imaging: Yes     3. Tolerating PO medications: Yes     4. Return to near baseline physical activity: Yes     5. Cleared for discharge by consultants (if involved): Yes     Discharge Planner Nurse   Safe discharge environment identified: Yes  Barriers to discharge: No       Entered by: Aura Esposito 09/27/2018 9:10 AM      Pt is A&Ox4, VSS. Patient feels a little better today but still dizzy with movement. Anticipate discharge. SBA. Regular diet. PRN zofran. Scheduled meclazine. Will continue to monitor and assess.     Please review provider order for any additional goals.   Nurse to notify provider when observation goals have been met and patient is ready for discharge.

## 2018-09-27 NOTE — PLAN OF CARE
Problem: Patient Care Overview  Goal: Plan of Care/Patient Progress Review  Outcome: No Change  PRIMARY DIAGNOSIS: VERTIGO      OUTPATIENT/OBSERVATION GOALS TO BE MET BEFORE DISCHARGE  1. Orthostatic performed: No      2. Completion of appropriate imaging: Yes      3. Tolerating PO medications: Yes      4. Return to near baseline physical activity: No, SBA due  To dizziness      5. Cleared for discharge by consultants (if involved): No      Discharge Planner Nurse   Safe discharge environment identified: Yes  Barriers to discharge: No      Patient is A&Ox4, VSS. Up SBA for safety because dizziness is still very bad. Dizzy with movement and at rest. Scheduled meclazine. Valium makes patient tired, not helpful for dizziness. IV infiltrated and was removed. Regular diet. Will continue to monitor and provide cares.      Please review provider order for any additional goals. Nurse to notify provider when observation goals have been met and patient is ready for discharge.

## 2018-09-27 NOTE — PLAN OF CARE
Problem: Patient Care Overview  Goal: Plan of Care/Patient Progress Review      Discharge Planner PT   Patient plan for discharge: home  Current status: Patient notes improvement in symptoms after treatment yesterday, but continues to struggle with mobility secondary to dizziness. Pt able to ambulate in room with SBA, steadiness much improved from previous session. Pt tests positive for R sided BPPV this date. Pt completes Epley maneuver x 3 reps with nystagmus in first position on all 3 reps. Nystagmus duration shortens, and severity of symptoms improves with each rep. Pt returned to reclined sitting in bed at end of session.     Barriers to return to prior living situation: remains unsteady with gait secondary to dizziness requiring assist with all moiblity  Recommendations for discharge: Home with OP vestibular rehab  Rationale for recommendations: Patient presents with symptoms suggestive of both vestibular neuritis (history of recent illness, previous vertigo episode following illness) and BPPV (nystagmus and increased symptoms during Gayla- Hallpike assessment, reduced symptoms following Epley maneuver).  Recommend continued follow up in OP setting.        Entered by: Tosin Rosas 09/27/2018 12:08 PM         Physical Therapy Discharge Summary    Reason for therapy discharge:    Discharged to home with outpatient therapy.    Progress towards therapy goal(s). See goals on Care Plan in [facility] electronic health record for goal details.  Goals partially met.  Barriers to achieving goals:   discharge from facility. and residual dizziness    Therapy recommendation(s):    Continued therapy is recommended.  Rationale/Recommendations:  OP vestibular rehab for continued BPPV treatment. .

## 2018-09-27 NOTE — PLAN OF CARE
Problem: Patient Care Overview  Goal: Plan of Care/Patient Progress Review  Patient's After Visit Summary was reviewed with patient and spouse  Patient verbalized understanding of After Visit Summary, recommended follow up and was given an opportunity to ask questions.   Discharge medications sent home with patient/family: YES, zofran, meclizine, and prednisone   Discharged with spouse    Patient was stable and discharged via wheelchair ride.    OBSERVATION patient END time: [id number]

## 2018-09-27 NOTE — PLAN OF CARE
Problem: Patient Care Overview  Goal: Plan of Care/Patient Progress Review  PRIMARY DIAGNOSIS: VERTIGO    OUTPATIENT/OBSERVATION GOALS TO BE MET BEFORE DISCHARGE  1. Orthostatic performed: No    2. Completion of appropriate imaging: Yes    3. Tolerating PO medications: Yes    4. Return to near baseline physical activity: Yes    5. Cleared for discharge by consultants (if involved): Yes    Discharge Planner Nurse   Safe discharge environment identified: Yes  Barriers to discharge: No       Entered by: Aura Esposito 09/27/2018 9:10 AM     Pt is A&Ox4, VSS. Patient feels a little better today but still dizzy with movement. Plan is for PT to see patient at noon for treatment and then patient will discharge after that. SBA. Regular diet. PRN zofran. Scheduled meclazine. Will continue to monitor and assess.      Please review provider order for any additional goals.   Nurse to notify provider when observation goals have been met and patient is ready for discharge.

## 2018-10-02 ENCOUNTER — HOSPITAL ENCOUNTER (OUTPATIENT)
Dept: PHYSICAL THERAPY | Facility: CLINIC | Age: 48
Setting detail: THERAPIES SERIES
End: 2018-10-02
Attending: PHYSICIAN ASSISTANT
Payer: COMMERCIAL

## 2018-10-02 PROCEDURE — 97112 NEUROMUSCULAR REEDUCATION: CPT | Mod: GP | Performed by: PHYSICAL THERAPIST

## 2018-10-02 PROCEDURE — 40000840 ZZHC STATISTIC PT VESTIBULAR VISIT: Performed by: PHYSICAL THERAPIST

## 2018-10-02 PROCEDURE — 97161 PT EVAL LOW COMPLEX 20 MIN: CPT | Mod: GP | Performed by: PHYSICAL THERAPIST

## 2018-10-02 NOTE — PROGRESS NOTES
10/02/18 0835   Quick Adds   Quick Adds Vestibular Eval   Type of Visit Initial OP PT Evaluation   General Information   Start of Care Date 10/02/18   Referring Physician Griffin, Heidi Gomez PA-C   Orders Evaluate and Treat as Indicated   Order Date 09/27/18   Medical Diagnosis Vestibular neuronitis, unspecified laterality H81.20  -    Onset of illness/injury or Date of Surgery 09/25/18   Surgical/Medical history reviewed Yes   Pertinent history of current vestibular problem (include personal factors and/or comorbidities that impact the POC)  Motion sickness;Prior concussion(s)   Pertinent history of current problem (include personal factors and/or comorbidities that impact the POC) Patient reports waking on Tuesday 9/25 with severe, unrelenting vertigo. She was vomiting and unable to ambulate without assistance. Her spouse called EMS and she was transported to [facility] where she was admitted x 2 days. While there she was seen by PT and treated for L BPPV and vestibular neuritis. She was prescribed meclizine, zofran and prednisone. She has discontinued the meclizine, takes zofran prn and has one more day on prednisone. She reports improvement in symptoms every day. She still feels foggy, gets tired easily and dizzy at times. She denies nausea or vomiting. She feels her balance is still off some, finding herself deviating to the right at times while walking and walking more slowly, cautiously than normal. She is scheduled to see ENT next week. She feels her allergies and illness have played into this recent episode of vertigo. She also reports h/o vertigo starting in Feb of this year. She woke with vertigo 2/1 and then was diagnosed influenza the following day. By the time she got to PT 3 weeks later, after her illness, there was no sign of vestibular dysfunction per pt. Vertigo had resolved. She had another epsidoe in August along with a sinus infection. Both of these episodes had more intermittent vertigo compared to  the constant vertigo she had this time around.   Pertinent Visual History  no vision changes. no glasses or contacts   Prior level of function comment independent in all mobility without AD   Previous/Current Treatment Physical Therapy;Medication(s)   Improvement after PT Significant  (in hospital)   Improvement after medication Significant  (meclizine, zofran, prednisone)   Current Community Support Family/friend caregiver   Patient role/Employment history Employed  ()   Living environment House/[facility]   Home/Community Accessibility Comments stairs ok with railing. haven't driven yet   Current Assistive Devices (none)   ADL Devices (none)   Patient/Family Goals Statement resolution of dizziness, improved balance, return to work and driving   Fall Risk Screen   Fall screen completed by PT   Have you fallen 2 or more times in the past year? No   Have you fallen and had an injury in the past year? No   Is patient a fall risk? Yes   Functional Scales   Functional Scales and Outcomes DHI 74/100   Pain   Patient currently in pain No   Pain comments sinus pressure on right   Cognitive Status Examination   Orientation orientation to person, place and time   Level of Consciousness alert   Follows Commands and Answers Questions 100% of the time   Personal Safety and Judgment intact   Posture   Posture Forward head position;Protracted shoulders   Bed Mobility   Bed Mobility Comments indep   Transfer Skills   Transfer Comments indep   Gait   Gait Comments WNL heel to toe gait, slowed speed   Gait Special Tests   Gait Special Tests DYNAMIC GAIT INDEX   Gait Special Tests Dynamic Gait Index   Comments 8/12 on 4 item DGI (9 or less indicates increased fall risk)   Balance Special Tests   Balance Special Tests Modified CTSIB Conditions   Balance Special Tests Modified CTSIB Conditions   Condition 1, seconds 30 Seconds   Condition 2, seconds 30 Seconds   Condition 4, seconds 30 Seconds   Condition 5, seconds 7  Seconds   Sensory Examination   Sensory Perception no deficits were identified   Coordination   Coordination no deficits were identified   Cervicogenic Screen   Neck ROM WNL   Oculomotor Exam   Smooth Pursuit Normal   Saccades Normal   VOR Normal   VOR Comments slow VOR   Rapid Head Thrust Normal   Infrared Goggle Exam or Frenzel Lense Exam   Vestibular Suppressant in Last 24 Hours? No  (discont meclizine Sunday)   Exam completed with Infrared Goggles   Spontaneous Nystagmus Negative   Gaze Evoked Nystagmus Negative   Head Shake Horizontal Nystagmus Horizontal L   Head Shake Horizontal Nystagmus comments 2 beats   Gayla-Hallpike (right) Negative   [place]-Hallpike (Left) Negative   HSCC Supine Roll Test (Right) Negative   HSCC Supine Roll Test (Left) Negative   Dynamic Visual Acuity (DVA)   Static Acuity (LogMar) 20/10   Horizontal Head Movement at 1 Hz (LogMar) 20/10   Horizontal Head Movement at 2 Hz (LogMar) 20/30 (5 line loss with 2 or less norm)   DVA Comments dizziness and nausea   Planned Therapy Interventions   Planned Therapy Interventions balance training;gait training;neuromuscular re-education;other (see comments)   Planned Therapy Interventions Comment vestibular rehab   Clinical Impression   Criteria for Skilled Therapeutic Interventions Met yes, treatment indicated   PT Diagnosis s/s peripheral vestibular imbalance    Influenced by the following impairments dizziness, imbalance, gait instability, impaired DVA   Functional limitations due to impairments community ambulation, driving, teaching, household tasks, bed mobility, shopping   Clinical Presentation Stable/Uncomplicated   Clinical Presentation Rationale s/s with steady improvement since onset   Clinical Decision Making (Complexity) Low complexity   Therapy Frequency 1 time/week   Predicted Duration of Therapy Intervention (days/wks) 3 weeks   Risk & Benefits of therapy have been explained Yes   Patient, Family & other staff in agreement with plan of  care Yes   Education Assessment   Barriers to Learning No barriers   GOALS   PT Eval Goals 1;2;3   Goal 1   Goal Identifier DVA   Goal Description Patient will have normal DVA with no greater than 2 line loss with testing for ability to walk and talk in busy visual environments ie school and grocery store without dizziness or instability.   Target Date 10/31/18   Goal 2   Goal Identifier DHI   Goal Description Patient will score less than 20/100 on DHI indicating reduced perception of dizziness and handicap for full participation in daily household, work tasks and social activities.   Target Date 10/31/18   Goal 3   Goal Identifier 4 item DGI   Goal Description Patient will score at least 11/12 on 4 item DGI indicating normal gait stability and reduced fall risk for community ambulation.   Target Date 10/31/18   Total Evaluation Time   Total Evaluation Time (Minutes) 48

## 2018-10-10 ENCOUNTER — HOSPITAL ENCOUNTER (OUTPATIENT)
Dept: PHYSICAL THERAPY | Facility: CLINIC | Age: 48
Setting detail: THERAPIES SERIES
End: 2018-10-10
Attending: PHYSICIAN ASSISTANT
Payer: COMMERCIAL

## 2018-10-10 PROCEDURE — 97112 NEUROMUSCULAR REEDUCATION: CPT | Mod: GP | Performed by: PHYSICAL THERAPIST

## 2018-10-10 PROCEDURE — 40000840 ZZHC STATISTIC PT VESTIBULAR VISIT: Performed by: PHYSICAL THERAPIST

## 2018-10-17 ENCOUNTER — HOSPITAL ENCOUNTER (OUTPATIENT)
Dept: PHYSICAL THERAPY | Facility: CLINIC | Age: 48
Setting detail: THERAPIES SERIES
End: 2018-10-17
Attending: PHYSICIAN ASSISTANT
Payer: COMMERCIAL

## 2018-10-17 PROCEDURE — 95992 CANALITH REPOSITIONING PROC: CPT | Mod: GP | Performed by: PHYSICAL THERAPIST

## 2018-10-17 PROCEDURE — 40000840 ZZHC STATISTIC PT VESTIBULAR VISIT: Performed by: PHYSICAL THERAPIST

## 2018-10-23 ENCOUNTER — HOSPITAL ENCOUNTER (OUTPATIENT)
Dept: PHYSICAL THERAPY | Facility: CLINIC | Age: 48
Setting detail: THERAPIES SERIES
End: 2018-10-23
Attending: PHYSICIAN ASSISTANT
Payer: COMMERCIAL

## 2018-10-23 PROCEDURE — 97112 NEUROMUSCULAR REEDUCATION: CPT | Mod: GP | Performed by: PHYSICAL THERAPIST

## 2018-10-23 PROCEDURE — 40000840 ZZHC STATISTIC PT VESTIBULAR VISIT: Performed by: PHYSICAL THERAPIST

## 2018-11-06 ENCOUNTER — HOSPITAL ENCOUNTER (OUTPATIENT)
Dept: PHYSICAL THERAPY | Facility: CLINIC | Age: 48
Setting detail: THERAPIES SERIES
End: 2018-11-06
Attending: PHYSICIAN ASSISTANT
Payer: COMMERCIAL

## 2018-11-06 PROCEDURE — 40000840 ZZHC STATISTIC PT VESTIBULAR VISIT: Performed by: PHYSICAL THERAPIST

## 2018-11-06 PROCEDURE — 97112 NEUROMUSCULAR REEDUCATION: CPT | Mod: GP | Performed by: PHYSICAL THERAPIST

## 2020-07-29 DIAGNOSIS — Z11.59 ENCOUNTER FOR SCREENING FOR OTHER VIRAL DISEASES: Primary | ICD-10-CM

## 2020-08-03 NOTE — ADDENDUM NOTE
Encounter addended by: Lulú Gomez, PT on: 8/3/2020 10:20 AM   Actions taken: Clinical Note Signed, Episode resolved

## 2020-08-03 NOTE — PROGRESS NOTES
"Outpatient Physical Therapy Discharge Note     Patient: Lorraine Sepulveda  : 1970    Beginning/End Dates of Reporting Period:  10/2/18 to 18. Patient was seen for 5 visits in this EOC for vestibular and balance rehab.    Referring Provider: Heidi Griffin PA-C    Therapy Diagnosis: s/s peripheral vestibular imbalance      Client Self Report: Felt great for a week after last session, able to do daughter dance without a problem except turns got a little dizzy. \"just need to practice.\" She got a head cold last week and has not been feeling as good, not dizzy but foggy in the head. Chrio last night onset of brief spin with manip, not sure if was a diamond vertigo or just feeling off as her eyes were closed. No vertigo since.     Objective Measurements:  Objective Measure: IR exam  Details: negative s/s BPPV with roll test and hallpikes B. negative post head shaking nystagmus.  Objective Measure: DHI  Details: -- improvd from 74/100 at eval  Objective Measure: 4 item DGI  Details:  improved from 8 at eval  Objective Measure: DVA  Details: 3 line loss at 2 Hz, improved from 5 line loss at eval. 2 lines or less is norm.       Goals:  Goal Identifier DVA   Goal Description Patient will have normal DVA with no greater than 2 line loss with testing for ability to walk and talk in busy visual environments ie school and grocery store without dizziness or instability.   Target Date 10/31/18   Date Met      Progress: in progress, improved     Goal Identifier DHI   Goal Description Patient will score less than 20/100 on DHI indicating reduced perception of dizziness and handicap for full participation in daily household, work tasks and social activities.   Target Date 10/31/18   Date Met   18   Progress:     Goal Identifier 4 item DGI   Goal Description Patient will score at least 11/12 on 4 item DGI indicating normal gait stability and reduced fall risk for community ambulation.   Target Date " 10/31/18   Date Met   11/6/18   Progress:     Goal Identifier BPPV   Goal Description Patient will have negative s/s of BPPV for greater safety with functional mobility.   Target Date 10/31/18   Date Met   10/23/18   Progress:       Progress Toward Goals:   Progress this reporting period: Patient macho made good progress. She has experienced a significant reduction in dizziness with negative s/s of BPPV and improved DVA. She has normal gait stability without increased fall risk. She status above. Patient was placed on 2 week trial discharge after this visit.    Plan:  Discharge from therapy.    Discharge:    Reason for Discharge: no further skilled PT need. Did well through trial discharge.    Discharge Plan: Patient to continue home program.

## 2020-08-30 DIAGNOSIS — Z11.59 ENCOUNTER FOR SCREENING FOR OTHER VIRAL DISEASES: ICD-10-CM

## 2020-08-30 PROCEDURE — U0003 INFECTIOUS AGENT DETECTION BY NUCLEIC ACID (DNA OR RNA); SEVERE ACUTE RESPIRATORY SYNDROME CORONAVIRUS 2 (SARS-COV-2) (CORONAVIRUS DISEASE [COVID-19]), AMPLIFIED PROBE TECHNIQUE, MAKING USE OF HIGH THROUGHPUT TECHNOLOGIES AS DESCRIBED BY CMS-2020-01-R: HCPCS | Performed by: ORTHOPAEDIC SURGERY

## 2020-08-31 LAB
SARS-COV-2 RNA SPEC QL NAA+PROBE: NOT DETECTED
SPECIMEN SOURCE: NORMAL

## 2020-09-01 ENCOUNTER — ANESTHESIA EVENT (OUTPATIENT)
Dept: SURGERY | Facility: CLINIC | Age: 50
End: 2020-09-01
Payer: COMMERCIAL

## 2020-09-01 RX ORDER — TOPIRAMATE SPINKLE 15 MG/1
30 CAPSULE ORAL DAILY
COMMUNITY

## 2020-09-01 RX ORDER — CETIRIZINE HYDROCHLORIDE 10 MG/1
10 TABLET ORAL DAILY
COMMUNITY

## 2020-09-02 ENCOUNTER — ANESTHESIA (OUTPATIENT)
Dept: SURGERY | Facility: CLINIC | Age: 50
End: 2020-09-02
Payer: COMMERCIAL

## 2020-09-02 ENCOUNTER — HOSPITAL ENCOUNTER (OUTPATIENT)
Facility: CLINIC | Age: 50
Discharge: HOME | End: 2020-09-02
Attending: ORTHOPAEDIC SURGERY | Admitting: ORTHOPAEDIC SURGERY
Payer: COMMERCIAL

## 2020-09-02 VITALS
SYSTOLIC BLOOD PRESSURE: 126 MMHG | HEART RATE: 73 BPM | HEIGHT: 66 IN | OXYGEN SATURATION: 94 % | BODY MASS INDEX: 47.09 KG/M2 | DIASTOLIC BLOOD PRESSURE: 71 MMHG | RESPIRATION RATE: 14 BRPM | WEIGHT: 293 LBS | TEMPERATURE: 97.5 F

## 2020-09-02 DIAGNOSIS — M25.569 KNEE PAIN, UNSPECIFIED CHRONICITY, UNSPECIFIED LATERALITY: Primary | ICD-10-CM

## 2020-09-02 LAB — GLUCOSE BLDC GLUCOMTR-MCNC: 84 MG/DL (ref 70–99)

## 2020-09-02 PROCEDURE — 25000125 ZZHC RX 250: Performed by: NURSE ANESTHETIST, CERTIFIED REGISTERED

## 2020-09-02 PROCEDURE — 37000009 ZZH ANESTHESIA TECHNICAL FEE, EACH ADDTL 15 MIN: Performed by: ORTHOPAEDIC SURGERY

## 2020-09-02 PROCEDURE — 40000170 ZZH STATISTIC PRE-PROCEDURE ASSESSMENT II: Performed by: ORTHOPAEDIC SURGERY

## 2020-09-02 PROCEDURE — 27211024 ZZHC OR SUPPLY OTHER OPNP: Performed by: ORTHOPAEDIC SURGERY

## 2020-09-02 PROCEDURE — 25000132 ZZH RX MED GY IP 250 OP 250 PS 637: Performed by: ANESTHESIOLOGY

## 2020-09-02 PROCEDURE — C1713 ANCHOR/SCREW BN/BN,TIS/BN: HCPCS | Performed by: ORTHOPAEDIC SURGERY

## 2020-09-02 PROCEDURE — 36000057 ZZH SURGERY LEVEL 3 1ST 30 MIN - UMMC: Performed by: ORTHOPAEDIC SURGERY

## 2020-09-02 PROCEDURE — 25000128 H RX IP 250 OP 636: Performed by: NURSE ANESTHETIST, CERTIFIED REGISTERED

## 2020-09-02 PROCEDURE — 27210794 ZZH OR GENERAL SUPPLY STERILE: Performed by: ORTHOPAEDIC SURGERY

## 2020-09-02 PROCEDURE — 25000132 ZZH RX MED GY IP 250 OP 250 PS 637: Performed by: STUDENT IN AN ORGANIZED HEALTH CARE EDUCATION/TRAINING PROGRAM

## 2020-09-02 PROCEDURE — 71000015 ZZH RECOVERY PHASE 1 LEVEL 2 EA ADDTL HR: Performed by: ORTHOPAEDIC SURGERY

## 2020-09-02 PROCEDURE — 25000128 H RX IP 250 OP 636: Performed by: ORTHOPAEDIC SURGERY

## 2020-09-02 PROCEDURE — 71000027 ZZH RECOVERY PHASE 2 EACH 15 MINS: Performed by: ORTHOPAEDIC SURGERY

## 2020-09-02 PROCEDURE — 25800030 ZZH RX IP 258 OP 636: Performed by: NURSE ANESTHETIST, CERTIFIED REGISTERED

## 2020-09-02 PROCEDURE — 82962 GLUCOSE BLOOD TEST: CPT

## 2020-09-02 PROCEDURE — 36000059 ZZH SURGERY LEVEL 3 EA 15 ADDTL MIN UMMC: Performed by: ORTHOPAEDIC SURGERY

## 2020-09-02 PROCEDURE — 71000014 ZZH RECOVERY PHASE 1 LEVEL 2 FIRST HR: Performed by: ORTHOPAEDIC SURGERY

## 2020-09-02 PROCEDURE — 25000128 H RX IP 250 OP 636: Performed by: STUDENT IN AN ORGANIZED HEALTH CARE EDUCATION/TRAINING PROGRAM

## 2020-09-02 PROCEDURE — 25000125 ZZHC RX 250: Performed by: ORTHOPAEDIC SURGERY

## 2020-09-02 PROCEDURE — 25800025 ZZH RX 258: Performed by: ORTHOPAEDIC SURGERY

## 2020-09-02 PROCEDURE — 25000132 ZZH RX MED GY IP 250 OP 250 PS 637: Performed by: PHYSICIAN ASSISTANT

## 2020-09-02 PROCEDURE — 37000008 ZZH ANESTHESIA TECHNICAL FEE, 1ST 30 MIN: Performed by: ORTHOPAEDIC SURGERY

## 2020-09-02 PROCEDURE — 25000566 ZZH SEVOFLURANE, EA 15 MIN: Performed by: ORTHOPAEDIC SURGERY

## 2020-09-02 DEVICE — DBL LOADED 4.75MM BIO-COMP SWVLK
Type: IMPLANTABLE DEVICE | Site: KNEE | Status: FUNCTIONAL
Brand: ARTHREX®

## 2020-09-02 RX ORDER — MEPERIDINE HYDROCHLORIDE 25 MG/ML
12.5 INJECTION INTRAMUSCULAR; INTRAVENOUS; SUBCUTANEOUS
Status: DISCONTINUED | OUTPATIENT
Start: 2020-09-02 | End: 2020-09-02 | Stop reason: HOSPADM

## 2020-09-02 RX ORDER — ONDANSETRON 4 MG/1
4 TABLET, ORALLY DISINTEGRATING ORAL EVERY 30 MIN PRN
Status: DISCONTINUED | OUTPATIENT
Start: 2020-09-02 | End: 2020-09-02 | Stop reason: HOSPADM

## 2020-09-02 RX ORDER — LORAZEPAM 2 MG/ML
.5-1 INJECTION INTRAMUSCULAR
Status: COMPLETED | OUTPATIENT
Start: 2020-09-02 | End: 2020-09-02

## 2020-09-02 RX ORDER — NALOXONE HYDROCHLORIDE 0.4 MG/ML
.1-.4 INJECTION, SOLUTION INTRAMUSCULAR; INTRAVENOUS; SUBCUTANEOUS
Status: DISCONTINUED | OUTPATIENT
Start: 2020-09-02 | End: 2020-09-02 | Stop reason: HOSPADM

## 2020-09-02 RX ORDER — FENTANYL CITRATE 50 UG/ML
INJECTION, SOLUTION INTRAMUSCULAR; INTRAVENOUS PRN
Status: DISCONTINUED | OUTPATIENT
Start: 2020-09-02 | End: 2020-09-02

## 2020-09-02 RX ORDER — HYDROMORPHONE HYDROCHLORIDE 1 MG/ML
.3-.5 INJECTION, SOLUTION INTRAMUSCULAR; INTRAVENOUS; SUBCUTANEOUS EVERY 10 MIN PRN
Status: DISCONTINUED | OUTPATIENT
Start: 2020-09-02 | End: 2020-09-02 | Stop reason: HOSPADM

## 2020-09-02 RX ORDER — PROPOFOL 10 MG/ML
INJECTION, EMULSION INTRAVENOUS CONTINUOUS PRN
Status: DISCONTINUED | OUTPATIENT
Start: 2020-09-02 | End: 2020-09-02

## 2020-09-02 RX ORDER — ACETAMINOPHEN 325 MG/1
650 TABLET ORAL
Status: DISCONTINUED | OUTPATIENT
Start: 2020-09-02 | End: 2020-09-02 | Stop reason: HOSPADM

## 2020-09-02 RX ORDER — LIDOCAINE HYDROCHLORIDE 20 MG/ML
INJECTION, SOLUTION INFILTRATION; PERINEURAL PRN
Status: DISCONTINUED | OUTPATIENT
Start: 2020-09-02 | End: 2020-09-02

## 2020-09-02 RX ORDER — CEFAZOLIN SODIUM 1 G/3ML
1 INJECTION, POWDER, FOR SOLUTION INTRAMUSCULAR; INTRAVENOUS SEE ADMIN INSTRUCTIONS
Status: DISCONTINUED | OUTPATIENT
Start: 2020-09-02 | End: 2020-09-02 | Stop reason: HOSPADM

## 2020-09-02 RX ORDER — ACETAMINOPHEN 325 MG/1
975 TABLET ORAL ONCE
Status: COMPLETED | OUTPATIENT
Start: 2020-09-02 | End: 2020-09-02

## 2020-09-02 RX ORDER — OXYCODONE HYDROCHLORIDE 5 MG/1
5 TABLET ORAL EVERY 4 HOURS PRN
Status: DISCONTINUED | OUTPATIENT
Start: 2020-09-02 | End: 2020-09-02 | Stop reason: HOSPADM

## 2020-09-02 RX ORDER — OXYCODONE HYDROCHLORIDE 5 MG/1
5-10 TABLET ORAL EVERY 4 HOURS PRN
Qty: 20 TABLET | Refills: 0 | Status: SHIPPED | OUTPATIENT
Start: 2020-09-02

## 2020-09-02 RX ORDER — SODIUM CHLORIDE, SODIUM LACTATE, POTASSIUM CHLORIDE, CALCIUM CHLORIDE 600; 310; 30; 20 MG/100ML; MG/100ML; MG/100ML; MG/100ML
INJECTION, SOLUTION INTRAVENOUS CONTINUOUS
Status: DISCONTINUED | OUTPATIENT
Start: 2020-09-02 | End: 2020-09-02 | Stop reason: HOSPADM

## 2020-09-02 RX ORDER — SODIUM CHLORIDE, SODIUM LACTATE, POTASSIUM CHLORIDE, CALCIUM CHLORIDE 600; 310; 30; 20 MG/100ML; MG/100ML; MG/100ML; MG/100ML
INJECTION, SOLUTION INTRAVENOUS CONTINUOUS PRN
Status: DISCONTINUED | OUTPATIENT
Start: 2020-09-02 | End: 2020-09-02

## 2020-09-02 RX ORDER — BUPIVACAINE HYDROCHLORIDE AND EPINEPHRINE 2.5; 5 MG/ML; UG/ML
INJECTION, SOLUTION INFILTRATION; PERINEURAL PRN
Status: DISCONTINUED | OUTPATIENT
Start: 2020-09-02 | End: 2020-09-02 | Stop reason: HOSPADM

## 2020-09-02 RX ORDER — FENTANYL CITRATE 50 UG/ML
25-50 INJECTION, SOLUTION INTRAMUSCULAR; INTRAVENOUS
Status: DISCONTINUED | OUTPATIENT
Start: 2020-09-02 | End: 2020-09-02 | Stop reason: HOSPADM

## 2020-09-02 RX ORDER — ONDANSETRON 2 MG/ML
INJECTION INTRAMUSCULAR; INTRAVENOUS PRN
Status: DISCONTINUED | OUTPATIENT
Start: 2020-09-02 | End: 2020-09-02

## 2020-09-02 RX ORDER — KETOROLAC TROMETHAMINE 30 MG/ML
INJECTION, SOLUTION INTRAMUSCULAR; INTRAVENOUS PRN
Status: DISCONTINUED | OUTPATIENT
Start: 2020-09-02 | End: 2020-09-02

## 2020-09-02 RX ORDER — GABAPENTIN 100 MG/1
300 CAPSULE ORAL ONCE
Status: COMPLETED | OUTPATIENT
Start: 2020-09-02 | End: 2020-09-02

## 2020-09-02 RX ORDER — OXYCODONE HYDROCHLORIDE 5 MG/1
5 TABLET ORAL
Status: COMPLETED | OUTPATIENT
Start: 2020-09-02 | End: 2020-09-02

## 2020-09-02 RX ORDER — PROPOFOL 10 MG/ML
INJECTION, EMULSION INTRAVENOUS PRN
Status: DISCONTINUED | OUTPATIENT
Start: 2020-09-02 | End: 2020-09-02

## 2020-09-02 RX ORDER — ONDANSETRON 2 MG/ML
4 INJECTION INTRAMUSCULAR; INTRAVENOUS EVERY 30 MIN PRN
Status: DISCONTINUED | OUTPATIENT
Start: 2020-09-02 | End: 2020-09-02 | Stop reason: HOSPADM

## 2020-09-02 RX ORDER — CEFAZOLIN SODIUM IN 0.9 % NACL 3 G/100 ML
3 INTRAVENOUS SOLUTION, PIGGYBACK (ML) INTRAVENOUS
Status: COMPLETED | OUTPATIENT
Start: 2020-09-02 | End: 2020-09-02

## 2020-09-02 RX ADMIN — FENTANYL CITRATE 50 MCG: 50 INJECTION, SOLUTION INTRAMUSCULAR; INTRAVENOUS at 15:48

## 2020-09-02 RX ADMIN — SODIUM CHLORIDE, POTASSIUM CHLORIDE, SODIUM LACTATE AND CALCIUM CHLORIDE: 600; 310; 30; 20 INJECTION, SOLUTION INTRAVENOUS at 14:00

## 2020-09-02 RX ADMIN — FENTANYL CITRATE 50 MCG: 50 INJECTION, SOLUTION INTRAMUSCULAR; INTRAVENOUS at 15:20

## 2020-09-02 RX ADMIN — OXYCODONE HYDROCHLORIDE 5 MG: 5 TABLET ORAL at 17:10

## 2020-09-02 RX ADMIN — FENTANYL CITRATE 50 MCG: 50 INJECTION, SOLUTION INTRAMUSCULAR; INTRAVENOUS at 14:15

## 2020-09-02 RX ADMIN — PROPOFOL 20 MCG/KG/MIN: 10 INJECTION, EMULSION INTRAVENOUS at 14:13

## 2020-09-02 RX ADMIN — KETOROLAC TROMETHAMINE 30 MG: 30 INJECTION, SOLUTION INTRAMUSCULAR at 15:47

## 2020-09-02 RX ADMIN — MIDAZOLAM 2 MG: 1 INJECTION INTRAMUSCULAR; INTRAVENOUS at 14:00

## 2020-09-02 RX ADMIN — FENTANYL CITRATE 50 MCG: 50 INJECTION, SOLUTION INTRAMUSCULAR; INTRAVENOUS at 16:10

## 2020-09-02 RX ADMIN — ACETAMINOPHEN 975 MG: 325 TABLET, FILM COATED ORAL at 12:01

## 2020-09-02 RX ADMIN — GABAPENTIN 300 MG: 300 CAPSULE ORAL at 12:01

## 2020-09-02 RX ADMIN — ACETAMINOPHEN 1000 MG: 500 TABLET ORAL at 20:17

## 2020-09-02 RX ADMIN — HYDROMORPHONE HYDROCHLORIDE 0.5 MG: 1 INJECTION, SOLUTION INTRAMUSCULAR; INTRAVENOUS; SUBCUTANEOUS at 17:18

## 2020-09-02 RX ADMIN — ONDANSETRON 4 MG: 2 INJECTION INTRAMUSCULAR; INTRAVENOUS at 15:31

## 2020-09-02 RX ADMIN — HYDROMORPHONE HYDROCHLORIDE 0.5 MG: 1 INJECTION, SOLUTION INTRAMUSCULAR; INTRAVENOUS; SUBCUTANEOUS at 15:46

## 2020-09-02 RX ADMIN — ONDANSETRON 4 MG: 2 INJECTION INTRAMUSCULAR; INTRAVENOUS at 18:21

## 2020-09-02 RX ADMIN — PROPOFOL 200 MG: 10 INJECTION, EMULSION INTRAVENOUS at 14:11

## 2020-09-02 RX ADMIN — FENTANYL CITRATE 50 MCG: 50 INJECTION, SOLUTION INTRAMUSCULAR; INTRAVENOUS at 14:20

## 2020-09-02 RX ADMIN — DEXMEDETOMIDINE HYDROCHLORIDE 12 MCG: 100 INJECTION, SOLUTION INTRAVENOUS at 15:51

## 2020-09-02 RX ADMIN — Medication 100 MG: at 14:12

## 2020-09-02 RX ADMIN — HYDROMORPHONE HYDROCHLORIDE 0.5 MG: 1 INJECTION, SOLUTION INTRAMUSCULAR; INTRAVENOUS; SUBCUTANEOUS at 15:53

## 2020-09-02 RX ADMIN — LORAZEPAM 1 MG: 2 INJECTION INTRAMUSCULAR; INTRAVENOUS at 16:22

## 2020-09-02 RX ADMIN — DEXMEDETOMIDINE HYDROCHLORIDE 8 MCG: 100 INJECTION, SOLUTION INTRAVENOUS at 15:54

## 2020-09-02 RX ADMIN — HYDROMORPHONE HYDROCHLORIDE 0.5 MG: 1 INJECTION, SOLUTION INTRAMUSCULAR; INTRAVENOUS; SUBCUTANEOUS at 14:20

## 2020-09-02 RX ADMIN — Medication 3 G: at 14:15

## 2020-09-02 RX ADMIN — LIDOCAINE HYDROCHLORIDE 100 MG: 20 INJECTION, SOLUTION INFILTRATION; PERINEURAL at 14:11

## 2020-09-02 RX ADMIN — HYDROMORPHONE HYDROCHLORIDE 0.5 MG: 1 INJECTION, SOLUTION INTRAMUSCULAR; INTRAVENOUS; SUBCUTANEOUS at 17:08

## 2020-09-02 ASSESSMENT — MIFFLIN-ST. JEOR: SCORE: 1971.75

## 2020-09-02 NOTE — ANESTHESIA POSTPROCEDURE EVALUATION
Anesthesia POST Procedure Evaluation    Patient: Lorraine Sepulveda   MRN:     [id number] Gender:   female   Age:    50 year old :      1970        Preoperative Diagnosis: Medial meniscus tear [S83.249A]   Procedure(s):  Right Knee Arthroscopy, Medial Meniscal Root Repair  Right Knee Arthroscopy, Medial Meniscal Root Repair vs Meniscectomy   Postop Comments: No value filed.     Anesthesia Type: General       Disposition: Outpatient   Postop Pain Control: Challenging   PONV: No   Neuro/Psych: Uneventful            Sign Out: Acceptable/Baseline neuro status   Airway/Respiratory: Uneventful            Sign Out: Acceptable/Baseline resp. status   CV/Hemodynamics: Uneventful            Sign Out: Acceptable CV status   Other NRE: NONE   DID A NON-ROUTINE EVENT OCCUR? No    Event details/Postop Comments:  Cramping requiring muscle relaxant.          Last Anesthesia Record Vitals:  CRNA VITALS  2020 1526 - 2020 1626      2020             Pulse:  69    SpO2:  100 %          Last PACU Vitals:  Vitals Value Taken Time   /78 2020  5:00 PM   Temp 36.5  C (97.7  F) 2020  4:30 PM   Pulse 75 2020  5:11 PM   Resp 15 2020  5:11 PM   SpO2 99 % 2020  5:11 PM   Temp src     NIBP     Pulse     SpO2     Resp     Temp     Ht Rate     Temp 2     Vitals shown include unvalidated device data.      Electronically Signed By: Adin Velasquez MD, 2020, 5:12 PM

## 2020-09-02 NOTE — OR NURSING
Lorraine has a walker at home. She states she cannot walk on the leg for 6 weeks. NWB post procedure per patient

## 2020-09-02 NOTE — OR NURSING
Lorraine had an HCG on 8/24 which was negative. She does not want to repeat this today and pay for it. [employer] notified [facility].

## 2020-09-02 NOTE — BRIEF OP NOTE
[facility], [place]    Brief Operative Note    Pre-operative diagnosis: Medial meniscus tear [S83.249A]  Post-operative diagnosis Same as pre-operative diagnosis    Procedure: Procedure(s):  Right Knee Arthroscopy, Medial Meniscal Root Repair vs Meniscectomy  Right Knee Arthroscopy, Medial Meniscal Root Repair vs Meniscectomy  Surgeon: Surgeon(s) and Role:     * Adolfo Ram MD - Primary  Anesthesia: Choice   Estimated blood loss: 5cc  Drains: None  Specimens: * No specimens in log *  Findings:   None.  Complications: None.  Implants: * No implants in log *      PLAN:  - Discharge home in stable condition  - Oxycodone, Tylenol, Ibuprofen for pain   - 162mg asa daily x 1 month for DVT ppx  - TTWB with HKB locked in extension when ambulating  - PT: follow meniscus root repair protocol  - Follow up within 2 weeks for suture removal

## 2020-09-02 NOTE — ANESTHESIA CARE TRANSFER NOTE
Patient: Lorraine Sepulveda    Procedure(s):  Right Knee Arthroscopy, Medial Meniscal Root Repair  Right Knee Arthroscopy, Medial Meniscal Root Repair vs Meniscectomy    Diagnosis: Medial meniscus tear [S83.249A]  Diagnosis Additional Information: No value filed.    Anesthesia Type:   General     Note:  Airway :Face Mask  Patient transferred to:PACU  Handoff Report: Identifed the Patient, Identified the Reponsible Provider, Reviewed the pertinent medical history, Discussed the surgical course, Reviewed Intra-OP anesthesia mangement and issues during anesthesia, Set expectations for post-procedure period and Allowed opportunity for questions and acknowledgement of understanding      Vitals: (Last set prior to Anesthesia Care Transfer)    CRNA VITALS  9/2/2020 1526 - 9/2/2020 1600      9/2/2020             Pulse:  69    SpO2:  100 %                Electronically Signed By: YANNICK Cedeño CRNA  September 2, 2020  4:00 PM

## 2020-09-02 NOTE — ANESTHESIA PREPROCEDURE EVALUATION
"Anesthesia Pre-Procedure Evaluation    Patient: Lorraine Sepulveda   MRN:     [id number] Gender:   female   Age:    50 year old :      1970        Preoperative Diagnosis: Medial meniscus tear [S83.249A]   Procedure(s):  Right Knee Arthroscopy, Medial Meniscal Root Repair vs Meniscectomy  Right Knee Arthroscopy, Medial Meniscal Root Repair vs Meniscectomy     LABS:  CBC:   Lab Results   Component Value Date    WBC 8.9 2018    HGB 12.9 2018    HCT 38.9 2018     2018     BMP:   Lab Results   Component Value Date     2018    POTASSIUM 3.8 2018    CHLORIDE 107 2018    CO2 23 2018    BUN 13 2018    CR 0.72 2018     (H) 2018     COAGS: No results found for: PTT, INR, FIBR  POC: No results found for: BGM, HCG, HCGS  OTHER:   Lab Results   Component Value Date    LAKIA 8.6 2018        Preop Vitals    BP Readings from Last 3 Encounters:   18 126/75    Pulse Readings from Last 3 Encounters:   18 66      Resp Readings from Last 3 Encounters:   18 16    SpO2 Readings from Last 3 Encounters:   18 92%      Temp Readings from Last 1 Encounters:   18 35.6  C (96.1  F) (Oral)    Ht Readings from Last 1 Encounters:   18 1.676 m (5' 6\")      Wt Readings from Last 1 Encounters:   18 125.2 kg (276 lb)    Estimated body mass index is 44.55 kg/m  as calculated from the following:    Height as of 18: 1.676 m (5' 6\").    Weight as of 18: 125.2 kg (276 lb).     LDA:        Past Medical History:   Diagnosis Date     Asthma      Migraines      Uncomplicated asthma      Vestibular neuritis       History reviewed. No pertinent surgical history.   Allergies   Allergen Reactions     Dust Mites      Seasonal Allergies         Anesthesia Evaluation     . Pt has had prior anesthetic. Type: General (D&E, nasal surgery, Breast biopsy and dental sugery)    No history of anesthetic " "complications          ROS/MED HX    ENT/Pulmonary:     (+)sleep apnea, allergic rhinitis, Intermittent asthma Last exacerbation: Several months ago,Treatment: Inhaler prn,  uses CPAP 4 cmH2O , . .    Neurologic: Comment: Vestibular neuritis causing vertigo dx 2018.  On topiramate and doing well.      Cardiovascular:     (+) ----. : . . . :. . Previous cardiac testing date:results:date: results:ECG reviewed date:8/24/20 results:NSR; non-specific ST changes date: results:          METS/Exercise Tolerance:  >4 METS   Hematologic:  - neg hematologic  ROS       Musculoskeletal: Comment: Right knee meniscal tear        GI/Hepatic: Comment: IBS        Renal/Genitourinary:         Endo:     (+) Obesity (BMI >40), .      Psychiatric:         Infectious Disease:         Malignancy:   (+)   \"Pap smear of cervix with cytologic evidence of malignancy.\"        Other:                         PHYSICAL EXAM:   Mental Status/Neuro: A/A/O   Airway: Facies: Feasible  Mallampati: III  Mouth/Opening: Full  TM distance: > 6 cm  Neck ROM: Full   Respiratory: Auscultation: CTAB     Resp. Rate: Normal     Resp. Effort: Normal      CV: Rhythm: Regular  Rate: Age appropriate  Heart: Normal Sounds  Edema: None   Comments:      Dental: Normal Dentition                Assessment:   ASA SCORE: 3    H&P: History and physical reviewed and following examination; no interval change.   Smoking Status:  Non-Smoker/Unknown   NPO Status: NPO Appropriate     Plan:   Anes. Type:  General   Pre-Medication: Midazolam; Gabapentin; Acetaminophen   Induction:  IV (Standard)   Airway: ETT; Oral; CMAC/VL   Access/Monitoring: PIV   Maintenance: Balanced     Postop Plan:   Postop Pain: Opioids  Postop Sedation/Airway: Not planned  Disposition: Outpatient     PONV Management:   Adult Risk Factors: Female, Non-Smoker, Postop Opioids   Prevention: Ondansetron, Dexamethasone     CONSENT: Direct conversation   Plan and risks discussed with: Mother   Blood Products: " Consent Deferred (Minimal Blood Loss)       Comments for Plan/Consent:  The patient brought her CPAP machine (pressure of 4)    Discussed common and potentially harmful risks for General Anesthesia.   These risks include, but were not limited to: Sore throat, Airway injury, Dental injury, Aspiration, Respiratory issues (Bronchospasm, Laryngospasm, Desaturation), Hemodynamic issues (Arrhythmia, Hypotension, Ischemia), Potential long term consequences of respiratory and hemodynamic issues, PONV, Emergence delirium, Increased Respiratory Risk (and therapy) due to Prevalent Airway condition, Potential overnight admission  Risks of invasive procedures were not discussed: N/A    All questions were answered.                 Blanca Aceves MD

## 2020-09-02 NOTE — DISCHARGE INSTRUCTIONS
Same-Day Surgery   Adult Discharge Orders & Instructions     For 24 hours after surgery:  1. Get plenty of rest.  A responsible adult must stay with you for at least 24 hours after you leave the hospital.   2. Pain medication can slow your reflexes. Do not drive or use heavy equipment.  If you have weakness or tingling, don't drive or use heavy equipment until this feeling goes away.  3. Mixing alcohol and pain medication can cause dizziness and slow your breathing. It can even be fatal. Do not drink alcohol while taking pain medication.  4. Avoid strenuous or risky activities.  Ask for help when climbing stairs.   5. You may feel lightheaded.  If so, sit for a few minutes before standing.  Have someone help you get up.   6. If you have nausea (feel sick to your stomach), drink only clear liquids such as apple juice, ginger ale, broth or 7-Up.  Rest may also help.  Be sure to drink enough fluids.  Move to a regular diet as you feel able. Take pain medications with a small amount of solid food, such as toast or crackers, to avoid nausea.   7. A slight fever is normal. Call the doctor if your fever is over 100 F (37.7 C) (taken under the tongue) or lasts longer than 24 hours.  8. You may have a dry mouth, muscle aches, trouble sleeping or a sore throat.  These symptoms should go away after 24 hours.  9. Do not make important or legal decisions.   Pain Management:      1. Take pain medication (if prescribed) for pain as directed by your physician.        2. WARNING: If the pain medication you have been prescribed contains Tylenol  (acetaminophen), DO NOT take additional doses of Tylenol (acetaminophen).     Call your doctor for any of the followin.  Signs of infection (fever, growing tenderness at the surgery site, severe pain, a large amount of drainage or bleeding, foul-smelling drainage, redness, swelling).    2.  It has been over 8 to 10 hours since surgery and you are still not able to urinate (pee).    3.   Headache for over 24 hours.    4.  Numbness, tingling or weakness the day after surgery (if you had spinal anesthesia).  To contact a doctor, call [phone] [OFFICE   or:      [phone] and ask for the Resident On Call for:        Orthopedics. (answered 24 hours a day)      Emergency Department:  [facility] [facility]: [phone]  [facility]: [phone]  Caring for Your Incision    You ll need to care for your incision after surgery and certain medical procedures. To close an incision, your doctor used sutures (stitches), steri-strips, staples, or dermabond. Follow the tips on this sheet to help heal and prevent infection of your incision.   Types of Incision Closure:    Surgical Sutures (stitches) are placed by sewing the edges of an incision together with surgical thread. Sutures are either absorbable or non-absorbable. Absorbable sutures break down in the body over time. Non-absorbable sutures need to be removed.     Steri-strips are made of adhesive (sticky) material to help hold the edges of an incision together. Steri-strips usually fall off by themselves in 7 to 10 days.     Dermabond (skin glue) is used to close a cut or small incision. The skin glue is less painful than stitches (sutures). In some cases, a lower layer of skin may be sutured before Dermabond is applied. The skin glue closes the cut/incision within a few minutes. It also provides a water-resistant covering. No bandage is required. Dermabond peels off on its own within 5 to 10 days.  Home Care for Your  Incision:    Keep the incision clean and dry. You should bathe only as directed by the doctor. It is okay to wash around the incision, but don t spray water directly on it.     Check the incision site daily for pain, redness, drainage, swelling, or separation of the incision edges.     If you have a dressing over the incision, change the dressing as directed by the doctor.    Make sure any clothing that  touches the incision is loose fitting. This will prevent rubbing. If the incision is on the head, avoid wearing caps or other head coverings. These may rub against the incision.    Avoid rough play, contact sports, or physical activities. This can put you at risk of opening an incision.     As your incision heals, the skin may appear pink or red. It may also feel slightly bumpy or raised. This is called a healing ridge. Over time, the color should fade and the raised skin will become less noticeable.   Call the doctor right away if you have any of the following:    Increased pain, redness, drainage, swelling or bleeding at the incision site    Numbness, coldness or tingling around the incision site    Fever of 101 F degrees or higher  If you have obstructive sleep apnea     You were given anesthesia medicine during surgery to keep you comfortable and free of pain. After surgery, you may have more apnea spells because of this medicine and other medicines you were given. The spells may last longer than usual.     At home:        Keep using the continuous positive airway pressure (CPAP) device when you sleep. Unless your health care provider tells you not to, use it when you sleep, day or night. CPAP is a common device used to treat obstructive sleep apnea.

## 2020-09-03 NOTE — OP NOTE
Procedure Date: 09/02/2020      PREOPERATIVE DIAGNOSIS:  Right knee medial meniscal root tear.      POSTOPERATIVE DIAGNOSIS:  Right knee medial meniscal root tear.      SURGEON:  Adolfo Ram MD.      ASSISTANT:  Huan Acevedo PA-C.      No resident was available for assistance.  The physician assistant was necessary to assist in suture passage and fixation, as well as patient positioning.      SECOND ASSISTANT:  Michael Doherty MD, Orthopedic Fellow.      ANESTHETIC:  General.      DRAINS:  None.      COUNTS:  Sponge and needle count were correct.      MATERIAL FORWARDED TO THE LABORATORY:  None.      OPERATIONS PERFORMED:  Right knee arthroscopic-assisted medial meniscal root repair.  I am requesting a 22 modifier.  Please see the addendum at the end of the operative note.      INDICATIONS:  Lorraine Sepulveda is a 50-year-old female with a symptomatic right medial meniscal root tear.  She does have some early medial femoral condyle chondral wear.  I had a long conversation with Lorraine in clinic regarding options.  We discussed nonoperative treatment.  We discussed surgical intervention.  She would like to proceed with arthroscopic evaluation of her meniscus root and possible fixation versus debridement.  I have explained the risks including bleeding, infection, nerve damage, complications from anesthesia, blood clots, etc.  I also explained the more pertinent risks with this type of surgery, including failure to relieve her symptoms.  There is a small possibility we could make her symptoms worse.  We could lose range of motion or she could have scar tissue that could be problematic and even require surgical management.  There is a possibility that the meniscus does not heal.  There is a possibility of implant complications.  She could end up developing osteoarthritis earlier in her life and require management.  Lorraine understands the surgery as well as the surgical risks and would like to proceed.      OPERATIVE  FINDINGS:  Examination under anesthesia reveals trace effusion.  Full range of motion.  Ligaments are stable.  The diagnostic arthroscopy reveals minimal chondral wear of the patella.  The trochlea is intact.  Lateral compartment reveals an intact lateral meniscus, normal articular cartilage.  The notch reveals intact cruciate ligaments.  The medial compartment reveals a radial tear approximately 3-4 mm from the root insertion.  The remaining meniscus is in good condition.  The tibial plateau is intact.  There is an area approximately 12 mm in diameter of grade 3 chondromalacia with unstable chondral flaps.      IMPLANTS:  A 4.75 BioComposite SwiveLock x1.      DESCRIPTION OF THE OPERATION:  After the patient was counseled, plans, alternatives and risks were discussed, consent was obtained.  The correct operative extremity was marked in the preoperative holding area.  Preoperative antibiotics were administered.  The patient was brought back to the operating suite and administered a general anesthetic.  Examination under anesthesia was performed and the findings are noted above.  The right lower extremity was prepped and draped in the usual sterile fashion.  A timeout process was completed.  A standard anterolateral arthroscopy portal was created followed by an anteromedial portal for working instruments and a superomedial portal for the outflow cannula.  A thorough diagnostic arthroscopy was undertaken and the findings are noted above.  The tibial spine was taken down and a small posterior notchplasty was performed to improve access.  An angled curette was used to abrade the posterior tibia and the medial meniscal root insertion site.  The unstable articular cartilage on the medial femoral condyle was gently debrided back to a stable margin.  A meniscal scorpion was used to deliver 0 FiberLink suture x 2 and luggage tag suture constructs were created.  A small anteromedial incision was created.  A guide pin was  placed up into the meniscal root insertion site.  A 4.5 mm reamer was used to create a tunnel.  The 2 FiberLink sutures were brought down through the tunnel.  These sutures nicely reduced our meniscus root to the tibia.  The sutures were impacted into the tibia with a 4.75 SwiveLock, achieving excellent purchase.  The knee was examined and the meniscus repair was noted to be quite stable.  The knee was copiously lavaged and the arthroscopic instruments were removed.  The tibial incision was copiously lavaged and closed in layers.  Portal sites closed with nylon suture.  A sterile dressing was applied.  The patient was placed in a hinged knee brace locked in extension.  She was extubated on the operating room table and taken to the recovery room in good condition.  She tolerated the procedure well.  There were no complications.  Estimated blood loss was approximately 5 mL.  A tourniquet was not used.      DISPOSITION:  The patient will be discharged home through Same Day Surgery per protocol.  Her weightbearing status will be touchdown weightbearing for 6 weeks.  Her range of motion will be 0-30 degrees for the first 3 weeks and then 0-90 degrees from week 3 to week 6.  She will utilize a hinged knee brace for 6 weeks.  The patient may remove her dressing on postoperative day 3 and shower, redressing her incisions with Band-Aids.  She will follow up at [facility] on postoperative day 8 for suture removal.      ADDENDUM:  I am requesting a 22 modifier for the meniscal root repair.  This meniscus root repair did require drilling of an osseous tunnel and shuttling the sutures extending the length of the case 20 minutes.         LAI ANDERSON MD             D: 2020   T: 2020   MT: JULES      Name:     OMAR SALDAÑA   MRN:      [id number]        Account:        [id number]   :      1970           Procedure Date: 2020      Document: [id number]

## 2020-09-03 NOTE — OR NURSING
Assisted patient &  to bathroom via recliner & walker, non-weight bearing. Slight balancing issue r/t one leg & use of walker, slight dizziness per patient settled & gone when sitting on toilet, not nauseated. Report given to Leela RHODES.

## (undated) DEVICE — Device

## (undated) DEVICE — GLOVE PROTEXIS W/NEU-THERA 7.5  2D73TE75

## (undated) DEVICE — SYR 10ML FINGER CONTROL W/O NDL 309695

## (undated) DEVICE — LINEN DRAPE 54X72" 5467

## (undated) DEVICE — SU VICRYL 2-0 CT-2 27" UND J269H

## (undated) DEVICE — NDL SPINAL 18GA 3.5" 405184

## (undated) DEVICE — TUBING ARTHROSCOPY PUMP ARTHREX AR-6410

## (undated) DEVICE — SOL WATER IRRIG 1000ML BOTTLE 2F7114

## (undated) DEVICE — SU ETHILON 3-0 PS-1 18" 1663H

## (undated) DEVICE — ESU HOLDER LAP INST DISP YELLOW SHORT 250MM H-PRO-250

## (undated) DEVICE — ESU GROUND PAD ADULT W/CORD E7507

## (undated) DEVICE — ARTHROSCOPIC CANNULA TWIST-IN PURPLE 7MMX7CM AR-6570

## (undated) DEVICE — NDL 18GA 1.5" 305196

## (undated) DEVICE — LINEN TOWEL PACK X5 5464

## (undated) DEVICE — DRAPE U-DRAPE 1015NSD NON-STERILE

## (undated) DEVICE — GLOVE PROTEXIS POWDER FREE 7.0 ORTHOPEDIC 2D73ET70

## (undated) DEVICE — GLOVE PROTEXIS W/NEU-THERA 7.0  2D73TE70

## (undated) DEVICE — PIN GUIDE ARTHREX 2.4MM DRILL  AR-1250L

## (undated) DEVICE — LINEN ORTHO PACK 5446

## (undated) DEVICE — GOWN XLG DISP 9545

## (undated) DEVICE — NDL ARTHREX SCORPION KNEE 2-0 AR-12990N

## (undated) DEVICE — BNDG ELASTIC 6" DBL LENGTH UNSTERILE 6611-16

## (undated) DEVICE — DECANTER TRANSFER DEVICE 2008S

## (undated) DEVICE — SYR BULB IRRIG 50ML LATEX FREE 0035280

## (undated) DEVICE — SUCTION MANIFOLD DORNOCH ULTRA CART UL-CL500

## (undated) DEVICE — SU ETHIBOND 0 CTX 30" X864H

## (undated) DEVICE — SOL NACL 0.9% IRRIG 3000ML BAG 2B7477

## (undated) DEVICE — TUBING SUCTION MEDI-VAC 1/4"X20' N620A

## (undated) DEVICE — BLADE KNIFE SURG 10 371110

## (undated) DEVICE — ABLATOR ARTHREX APOLLO RF MP90 ASPIRATING 90DEG AR-9811

## (undated) DEVICE — COVER CAMERA IN-LIGHT DISP LT-C02

## (undated) DEVICE — ESU PENCIL W/SMOKE EVAC NEPTUNE STRYKER 0703-046-000

## (undated) DEVICE — CAST PADDING 6" STERILE 9046S

## (undated) DEVICE — STRAP KNEE/BODY 31143004

## (undated) DEVICE — DRSG STERI STRIP 1/2X4" R1547

## (undated) DEVICE — SU MONOCRYL 3-0 PS-2 27" Y427H

## (undated) RX ORDER — ONDANSETRON 2 MG/ML
INJECTION INTRAMUSCULAR; INTRAVENOUS
Status: DISPENSED
Start: 2020-09-02

## (undated) RX ORDER — HYDROMORPHONE HYDROCHLORIDE 1 MG/ML
INJECTION, SOLUTION INTRAMUSCULAR; INTRAVENOUS; SUBCUTANEOUS
Status: DISPENSED
Start: 2020-09-02

## (undated) RX ORDER — GABAPENTIN 300 MG/1
CAPSULE ORAL
Status: DISPENSED
Start: 2020-09-02

## (undated) RX ORDER — FENTANYL CITRATE 50 UG/ML
INJECTION, SOLUTION INTRAMUSCULAR; INTRAVENOUS
Status: DISPENSED
Start: 2020-09-02

## (undated) RX ORDER — LORAZEPAM 2 MG/ML
INJECTION INTRAMUSCULAR
Status: DISPENSED
Start: 2020-09-02

## (undated) RX ORDER — ACETAMINOPHEN 325 MG/1
TABLET ORAL
Status: DISPENSED
Start: 2020-09-02

## (undated) RX ORDER — ACETAMINOPHEN 500 MG
TABLET ORAL
Status: DISPENSED
Start: 2020-09-02

## (undated) RX ORDER — CEFAZOLIN SODIUM IN 0.9 % NACL 3 G/100 ML
INTRAVENOUS SOLUTION, PIGGYBACK (ML) INTRAVENOUS
Status: DISPENSED
Start: 2020-09-02

## (undated) RX ORDER — BUPIVACAINE HYDROCHLORIDE AND EPINEPHRINE 2.5; 5 MG/ML; UG/ML
INJECTION, SOLUTION EPIDURAL; INFILTRATION; INTRACAUDAL; PERINEURAL
Status: DISPENSED
Start: 2020-09-02

## (undated) RX ORDER — OXYCODONE HYDROCHLORIDE 5 MG/1
TABLET ORAL
Status: DISPENSED
Start: 2020-09-02